# Patient Record
Sex: FEMALE | Race: BLACK OR AFRICAN AMERICAN | NOT HISPANIC OR LATINO | Employment: FULL TIME | URBAN - METROPOLITAN AREA
[De-identification: names, ages, dates, MRNs, and addresses within clinical notes are randomized per-mention and may not be internally consistent; named-entity substitution may affect disease eponyms.]

---

## 2017-01-18 ENCOUNTER — TELEPHONE (OUTPATIENT)
Dept: HEMATOLOGY/ONCOLOGY | Facility: CLINIC | Age: 32
End: 2017-01-18

## 2017-01-18 DIAGNOSIS — D57.40 SICKLE CELL BETA THALASSEMIA: Primary | ICD-10-CM

## 2017-01-18 NOTE — TELEPHONE ENCOUNTER
----- Message from Laila Etienne sent at 1/18/2017 11:40 AM CST -----  Contact: self  Pt was in the hospital and is calling to schedule an hospital follow up.    Contact number [phone]

## 2017-01-18 NOTE — TELEPHONE ENCOUNTER
Called patient and informed Dr Harrison wanted her to do a cbc next week. Scheduled Jan 24 at 9:30.

## 2017-01-18 NOTE — TELEPHONE ENCOUNTER
Patient said when she got discharged after having her baby they told her she needed to follow up with you. Her platelets were low and she received 2 blood transfusions in the last 2 months. She also wanted to know if we received any records from [facility].

## 2017-01-24 ENCOUNTER — LAB VISIT (OUTPATIENT)
Dept: LAB | Facility: OTHER | Age: 32
End: 2017-01-24
Attending: INTERNAL MEDICINE
Payer: MEDICAID

## 2017-01-24 ENCOUNTER — DOCUMENTATION ONLY (OUTPATIENT)
Dept: HEMATOLOGY/ONCOLOGY | Facility: CLINIC | Age: 32
End: 2017-01-24

## 2017-01-24 DIAGNOSIS — D57.40 SICKLE CELL BETA THALASSEMIA: ICD-10-CM

## 2017-01-24 LAB
ERYTHROCYTE [DISTWIDTH] IN BLOOD BY AUTOMATED COUNT: 23.4 %
HCT VFR BLD AUTO: 30.3 %
HGB BLD-MCNC: 9.9 G/DL
MCH RBC QN AUTO: 23.1 PG
MCHC RBC AUTO-ENTMCNC: 32.7 %
MCV RBC AUTO: 71 FL
NEUTROPHILS # BLD AUTO: 3.9 K/UL
PLATELET # BLD AUTO: 1026 K/UL
PLATELET BLD QL SMEAR: ABNORMAL
PMV BLD AUTO: 9.1 FL
RBC # BLD AUTO: 4.29 M/UL
WBC # BLD AUTO: 7.87 K/UL

## 2017-01-24 PROCEDURE — 36415 COLL VENOUS BLD VENIPUNCTURE: CPT

## 2017-01-24 PROCEDURE — 85027 COMPLETE CBC AUTOMATED: CPT

## 2017-01-30 ENCOUNTER — LAB VISIT (OUTPATIENT)
Dept: LAB | Facility: OTHER | Age: 32
End: 2017-01-30
Attending: INTERNAL MEDICINE
Payer: MEDICAID

## 2017-01-30 DIAGNOSIS — D57.40 SICKLE CELL BETA THALASSEMIA: ICD-10-CM

## 2017-01-30 DIAGNOSIS — D75.839 THROMBOCYTOSIS: ICD-10-CM

## 2017-01-30 LAB
ERYTHROCYTE [DISTWIDTH] IN BLOOD BY AUTOMATED COUNT: 23.9 %
HCT VFR BLD AUTO: 26.3 %
HGB BLD-MCNC: 8.6 G/DL
MCH RBC QN AUTO: 22.3 PG
MCHC RBC AUTO-ENTMCNC: 32.7 %
MCV RBC AUTO: 68 FL
NEUTROPHILS # BLD AUTO: 5.3 K/UL
PLATELET # BLD AUTO: 585 K/UL
PMV BLD AUTO: 9.3 FL
RBC # BLD AUTO: 3.86 M/UL
WBC # BLD AUTO: 10.64 K/UL

## 2017-01-30 PROCEDURE — 85027 COMPLETE CBC AUTOMATED: CPT

## 2017-01-30 PROCEDURE — 36415 COLL VENOUS BLD VENIPUNCTURE: CPT

## 2017-02-13 ENCOUNTER — LAB VISIT (OUTPATIENT)
Dept: LAB | Facility: OTHER | Age: 32
End: 2017-02-13
Attending: INTERNAL MEDICINE
Payer: MEDICAID

## 2017-02-13 DIAGNOSIS — D75.839 THROMBOCYTOSIS: ICD-10-CM

## 2017-02-13 LAB
ERYTHROCYTE [DISTWIDTH] IN BLOOD BY AUTOMATED COUNT: 23 %
HCT VFR BLD AUTO: 26.1 %
HGB BLD-MCNC: 8.4 G/DL
MCH RBC QN AUTO: 21.8 PG
MCHC RBC AUTO-ENTMCNC: 32.2 %
MCV RBC AUTO: 68 FL
NEUTROPHILS # BLD AUTO: 7 K/UL
PLATELET # BLD AUTO: 431 K/UL
PMV BLD AUTO: 9.4 FL
RBC # BLD AUTO: 3.85 M/UL
WBC # BLD AUTO: 10.81 K/UL

## 2017-02-13 PROCEDURE — 81270 JAK2 GENE: CPT

## 2017-02-13 PROCEDURE — 85027 COMPLETE CBC AUTOMATED: CPT

## 2017-02-13 PROCEDURE — 36415 COLL VENOUS BLD VENIPUNCTURE: CPT

## 2017-02-15 LAB
JAK2 P.V617F BLD/T QL: NORMAL
JAK2 V617F MUTATION DETECTION BLD RESULT: NORMAL

## 2017-04-07 ENCOUNTER — LAB VISIT (OUTPATIENT)
Dept: LAB | Facility: OTHER | Age: 32
End: 2017-04-07
Attending: INTERNAL MEDICINE
Payer: MEDICAID

## 2017-04-07 DIAGNOSIS — D57.40 SICKLE CELL BETA THALASSEMIA: ICD-10-CM

## 2017-04-07 LAB — FERRITIN SERPL-MCNC: 270 NG/ML

## 2017-04-07 PROCEDURE — 82728 ASSAY OF FERRITIN: CPT

## 2017-04-07 PROCEDURE — 36415 COLL VENOUS BLD VENIPUNCTURE: CPT

## 2017-04-10 ENCOUNTER — OFFICE VISIT (OUTPATIENT)
Dept: HEMATOLOGY/ONCOLOGY | Facility: CLINIC | Age: 32
End: 2017-04-10
Attending: INTERNAL MEDICINE
Payer: MEDICAID

## 2017-04-10 VITALS
WEIGHT: 127.44 LBS | HEART RATE: 61 BPM | RESPIRATION RATE: 16 BRPM | TEMPERATURE: 99 F | DIASTOLIC BLOOD PRESSURE: 86 MMHG | BODY MASS INDEX: 23.12 KG/M2 | SYSTOLIC BLOOD PRESSURE: 140 MMHG

## 2017-04-10 DIAGNOSIS — D57.40 SICKLE CELL BETA THALASSEMIA: Primary | ICD-10-CM

## 2017-04-10 PROCEDURE — 99213 OFFICE O/P EST LOW 20 MIN: CPT | Mod: PBBFAC | Performed by: INTERNAL MEDICINE

## 2017-04-10 PROCEDURE — 99999 PR PBB SHADOW E&M-EST. PATIENT-LVL III: CPT | Mod: PBBFAC,,, | Performed by: INTERNAL MEDICINE

## 2017-04-10 PROCEDURE — 99212 OFFICE O/P EST SF 10 MIN: CPT | Mod: S$PBB,,, | Performed by: INTERNAL MEDICINE

## 2017-04-10 RX ORDER — FERROUS GLUCONATE 324(38)MG
TABLET ORAL
Refills: 3 | COMMUNITY
Start: 2017-01-05

## 2017-04-10 NOTE — PROGRESS NOTES
Subjective:       Patient ID: Shi Tidwell is a 32 y.o. female.    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  She recently saw me to establish follow-up.  Unfortunately her disease has been mild with few complications.    She delivered a healthy girl at the beginning of this year and postdelivery her platelet count went up to greater than 1 million.  That was on January 24.  Subsequently week later her platelet count had decreased to 500,000 range and then 3 weeks later to 431,000.  Overall, she's been doing fairly well.  Chest occasional chest pain.  Energy levels been held up fairly well.      Review of Systems    Objective:      Physical Exam   Constitutional: She appears well-developed and well-nourished.   Vitals reviewed.      Assessment:     ferritin levels 270, her most recent hemoglobin was 8.4 on February 13. 1. Sickle cell beta thalassemia        Plan:       Return to clinic in 6 months with labs.

## 2017-10-18 ENCOUNTER — LAB VISIT (OUTPATIENT)
Dept: LAB | Facility: OTHER | Age: 32
End: 2017-10-18
Attending: INTERNAL MEDICINE
Payer: MEDICAID

## 2017-10-18 DIAGNOSIS — D57.40 SICKLE CELL BETA THALASSEMIA: ICD-10-CM

## 2017-10-18 LAB
ALBUMIN SERPL BCP-MCNC: 4.3 G/DL
ALP SERPL-CCNC: 61 U/L
ALT SERPL W/O P-5'-P-CCNC: 12 U/L
ANION GAP SERPL CALC-SCNC: 8 MMOL/L
AST SERPL-CCNC: 15 U/L
BILIRUB SERPL-MCNC: 1.3 MG/DL
BUN SERPL-MCNC: 9 MG/DL
CALCIUM SERPL-MCNC: 9.4 MG/DL
CHLORIDE SERPL-SCNC: 110 MMOL/L
CO2 SERPL-SCNC: 26 MMOL/L
CREAT SERPL-MCNC: 0.9 MG/DL
ERYTHROCYTE [DISTWIDTH] IN BLOOD BY AUTOMATED COUNT: 18.4 %
EST. GFR  (AFRICAN AMERICAN): >60 ML/MIN/1.73 M^2
EST. GFR  (NON AFRICAN AMERICAN): >60 ML/MIN/1.73 M^2
GLUCOSE SERPL-MCNC: 86 MG/DL
HCT VFR BLD AUTO: 29.5 %
HGB BLD-MCNC: 10.1 G/DL
MCH RBC QN AUTO: 21.8 PG
MCHC RBC AUTO-ENTMCNC: 34.2 G/DL
MCV RBC AUTO: 64 FL
NEUTROPHILS # BLD AUTO: 4.7 K/UL
PLATELET # BLD AUTO: 437 K/UL
PMV BLD AUTO: 9.2 FL
POTASSIUM SERPL-SCNC: 4.5 MMOL/L
PROT SERPL-MCNC: 7.6 G/DL
RBC # BLD AUTO: 4.63 M/UL
SODIUM SERPL-SCNC: 144 MMOL/L
WBC # BLD AUTO: 9.42 K/UL

## 2017-10-18 PROCEDURE — 80053 COMPREHEN METABOLIC PANEL: CPT

## 2017-10-18 PROCEDURE — 36415 COLL VENOUS BLD VENIPUNCTURE: CPT

## 2017-10-18 PROCEDURE — 85027 COMPLETE CBC AUTOMATED: CPT

## 2017-11-17 ENCOUNTER — TELEPHONE (OUTPATIENT)
Dept: HEMATOLOGY/ONCOLOGY | Facility: CLINIC | Age: 32
End: 2017-11-17

## 2017-11-17 NOTE — TELEPHONE ENCOUNTER
Message fwd to .       ----- Message from Darrell Sawant sent at 11/17/2017 10:12 AM CST -----  Contact: Shi Tidwell  X_  1st Request  _  2nd Request  _  3rd Request        Who: Shi Tidwell    Why: Patient called to reschedule appt and next available it [place] 10. Patient checking to see if anything available sooner.    What Number to Call Back: [phone] or [phone]    When to Expect a call back: (Within 24 hours)    Please return the call at earliest convenience. Thanks!

## 2018-02-08 ENCOUNTER — OFFICE VISIT (OUTPATIENT)
Dept: HEMATOLOGY/ONCOLOGY | Facility: CLINIC | Age: 33
End: 2018-02-08
Attending: INTERNAL MEDICINE
Payer: MEDICAID

## 2018-02-08 ENCOUNTER — LAB VISIT (OUTPATIENT)
Dept: LAB | Facility: OTHER | Age: 33
End: 2018-02-08
Attending: INTERNAL MEDICINE
Payer: MEDICAID

## 2018-02-08 VITALS
DIASTOLIC BLOOD PRESSURE: 96 MMHG | HEART RATE: 63 BPM | TEMPERATURE: 99 F | SYSTOLIC BLOOD PRESSURE: 133 MMHG | BODY MASS INDEX: 23.48 KG/M2 | RESPIRATION RATE: 19 BRPM | WEIGHT: 129.44 LBS

## 2018-02-08 DIAGNOSIS — D57.40 SICKLE CELL BETA THALASSEMIA: Primary | ICD-10-CM

## 2018-02-08 DIAGNOSIS — D57.40 SICKLE CELL BETA THALASSEMIA: ICD-10-CM

## 2018-02-08 LAB
ERYTHROCYTE [DISTWIDTH] IN BLOOD BY AUTOMATED COUNT: 18.8 %
HCT VFR BLD AUTO: 27.5 %
HGB BLD-MCNC: 9.4 G/DL
MCH RBC QN AUTO: 22.1 PG
MCHC RBC AUTO-ENTMCNC: 34.2 G/DL
MCV RBC AUTO: 65 FL
NEUTROPHILS # BLD AUTO: 6.1 K/UL
PLATELET # BLD AUTO: 427 K/UL
PMV BLD AUTO: 9 FL
RBC # BLD AUTO: 4.26 M/UL
WBC # BLD AUTO: 12.17 K/UL

## 2018-02-08 PROCEDURE — 99213 OFFICE O/P EST LOW 20 MIN: CPT | Mod: S$PBB,,, | Performed by: INTERNAL MEDICINE

## 2018-02-08 PROCEDURE — 99213 OFFICE O/P EST LOW 20 MIN: CPT | Mod: PBBFAC | Performed by: INTERNAL MEDICINE

## 2018-02-08 PROCEDURE — 85027 COMPLETE CBC AUTOMATED: CPT

## 2018-02-08 PROCEDURE — 99999 PR PBB SHADOW E&M-EST. PATIENT-LVL III: CPT | Mod: PBBFAC,,, | Performed by: INTERNAL MEDICINE

## 2018-02-08 PROCEDURE — 3008F BODY MASS INDEX DOCD: CPT | Mod: ,,, | Performed by: INTERNAL MEDICINE

## 2018-02-08 PROCEDURE — 36415 COLL VENOUS BLD VENIPUNCTURE: CPT

## 2018-02-08 NOTE — PROGRESS NOTES
Subjective:       Patient ID: Shi Tidwell is a 33 y.o. female.    Chief Complaint: No chief complaint on file.    Darius Tidwell is a very pleasant 31-year-old -American female with a history of sickle cell thalassemia.   She also had an episode of thrombocytosis after delivering a baby in early 2017 which resolved spontaneously.       She has had no significant issues since her last visit. Her only complaint is some fatigue.      Review of Systems   Constitutional: Negative for activity change and appetite change.   Respiratory: Negative for cough and shortness of breath.    Cardiovascular: Negative for chest pain.   Gastrointestinal: Negative for abdominal pain, constipation and diarrhea.   Neurological: Negative.        Objective:      Physical Exam   Constitutional: She is oriented to person, place, and time. She appears well-developed and well-nourished.   HENT:   Mouth/Throat: No oropharyngeal exudate.   Cardiovascular: Normal rate and regular rhythm.    Pulmonary/Chest: Breath sounds normal. No respiratory distress. She has no wheezes.   Abdominal: Soft. There is no tenderness.   Neurological: She is alert and oriented to person, place, and time.   Psychiatric: She has a normal mood and affect. Her behavior is normal. Thought content normal.   Vitals reviewed.      Assessment:     Hgb 9.4, plts 427,000  1. Sickle cell beta thalassemia        Plan:       Return to clinic in 6 months with labs.     Distress Screening Results: Psychosocial Distress screening score of Distress Score: 0 noted and reviewed. No intervention indicated.

## 2018-05-31 DIAGNOSIS — D57.40 SICKLE CELL BETA THALASSEMIA: Primary | ICD-10-CM

## 2018-10-26 ENCOUNTER — HOSPITAL ENCOUNTER (EMERGENCY)
Facility: OTHER | Age: 33
Discharge: HOME | End: 2018-10-26
Attending: EMERGENCY MEDICINE
Payer: MEDICAID

## 2018-10-26 VITALS
WEIGHT: 130 LBS | BODY MASS INDEX: 23.92 KG/M2 | OXYGEN SATURATION: 100 % | SYSTOLIC BLOOD PRESSURE: 142 MMHG | HEART RATE: 59 BPM | DIASTOLIC BLOOD PRESSURE: 88 MMHG | RESPIRATION RATE: 16 BRPM | HEIGHT: 62 IN | TEMPERATURE: 98 F

## 2018-10-26 DIAGNOSIS — R07.9 CHEST PAIN: ICD-10-CM

## 2018-10-26 DIAGNOSIS — M54.6 ACUTE LEFT-SIDED THORACIC BACK PAIN: Primary | ICD-10-CM

## 2018-10-26 DIAGNOSIS — R10.9 FLANK PAIN: ICD-10-CM

## 2018-10-26 LAB
ALBUMIN SERPL BCP-MCNC: 4.7 G/DL
ALP SERPL-CCNC: 47 U/L
ALT SERPL W/O P-5'-P-CCNC: 11 U/L
ANION GAP SERPL CALC-SCNC: 10 MMOL/L
ANISOCYTOSIS BLD QL SMEAR: ABNORMAL
AST SERPL-CCNC: 20 U/L
B-HCG UR QL: NEGATIVE
BASOPHILS # BLD AUTO: 0.07 K/UL
BASOPHILS NFR BLD: 0.6 %
BILIRUB SERPL-MCNC: 1.9 MG/DL
BILIRUB UR QL STRIP: NEGATIVE
BUN SERPL-MCNC: 8 MG/DL
CALCIUM SERPL-MCNC: 9.5 MG/DL
CHLORIDE SERPL-SCNC: 106 MMOL/L
CLARITY UR: CLEAR
CO2 SERPL-SCNC: 24 MMOL/L
COLOR UR: YELLOW
CREAT SERPL-MCNC: 0.9 MG/DL
CTP QC/QA: YES
DIFFERENTIAL METHOD: ABNORMAL
EOSINOPHIL # BLD AUTO: 0.2 K/UL
EOSINOPHIL NFR BLD: 1.6 %
ERYTHROCYTE [DISTWIDTH] IN BLOOD BY AUTOMATED COUNT: 18.8 %
EST. GFR  (AFRICAN AMERICAN): >60 ML/MIN/1.73 M^2
EST. GFR  (NON AFRICAN AMERICAN): >60 ML/MIN/1.73 M^2
GLUCOSE SERPL-MCNC: 73 MG/DL
GLUCOSE UR QL STRIP: NEGATIVE
HCT VFR BLD AUTO: 30 %
HGB BLD-MCNC: 10.1 G/DL
HGB UR QL STRIP: NEGATIVE
KETONES UR QL STRIP: NEGATIVE
LEUKOCYTE ESTERASE UR QL STRIP: NEGATIVE
LYMPHOCYTES # BLD AUTO: 3.8 K/UL
LYMPHOCYTES NFR BLD: 34.2 %
MCH RBC QN AUTO: 22.1 PG
MCHC RBC AUTO-ENTMCNC: 33.7 G/DL
MCV RBC AUTO: 66 FL
MONOCYTES # BLD AUTO: 0.8 K/UL
MONOCYTES NFR BLD: 6.7 %
NEUTROPHILS # BLD AUTO: 6.3 K/UL
NEUTROPHILS NFR BLD: 56.9 %
NITRITE UR QL STRIP: NEGATIVE
NRBC BLD-RTO: 2 /100 WBC
PH UR STRIP: 6 [PH] (ref 5–8)
PLATELET # BLD AUTO: 473 K/UL
PLATELET BLD QL SMEAR: ABNORMAL
PMV BLD AUTO: 9.2 FL
POIKILOCYTOSIS BLD QL SMEAR: ABNORMAL
POLYCHROMASIA BLD QL SMEAR: ABNORMAL
POTASSIUM SERPL-SCNC: 3.8 MMOL/L
PROT SERPL-MCNC: 8 G/DL
PROT UR QL STRIP: NEGATIVE
RBC # BLD AUTO: 4.58 M/UL
RETICS/RBC NFR AUTO: 4.6 %
SCHISTOCYTES BLD QL SMEAR: ABNORMAL
SICKLE CELLS BLD QL SMEAR: ABNORMAL
SODIUM SERPL-SCNC: 140 MMOL/L
SP GR UR STRIP: 1.01 (ref 1–1.03)
TARGETS BLD QL SMEAR: ABNORMAL
URN SPEC COLLECT METH UR: NORMAL
UROBILINOGEN UR STRIP-ACNC: NEGATIVE EU/DL
WBC # BLD AUTO: 11.17 K/UL

## 2018-10-26 PROCEDURE — 93005 ELECTROCARDIOGRAM TRACING: CPT

## 2018-10-26 PROCEDURE — 81025 URINE PREGNANCY TEST: CPT | Performed by: EMERGENCY MEDICINE

## 2018-10-26 PROCEDURE — 96361 HYDRATE IV INFUSION ADD-ON: CPT

## 2018-10-26 PROCEDURE — 81003 URINALYSIS AUTO W/O SCOPE: CPT

## 2018-10-26 PROCEDURE — 85045 AUTOMATED RETICULOCYTE COUNT: CPT

## 2018-10-26 PROCEDURE — 96360 HYDRATION IV INFUSION INIT: CPT

## 2018-10-26 PROCEDURE — 80053 COMPREHEN METABOLIC PANEL: CPT

## 2018-10-26 PROCEDURE — 85025 COMPLETE CBC W/AUTO DIFF WBC: CPT

## 2018-10-26 PROCEDURE — 99285 EMERGENCY DEPT VISIT HI MDM: CPT | Mod: 25

## 2018-10-26 PROCEDURE — 93010 ELECTROCARDIOGRAM REPORT: CPT | Mod: ,,, | Performed by: INTERNAL MEDICINE

## 2018-10-26 PROCEDURE — 25000003 PHARM REV CODE 250: Performed by: PHYSICIAN ASSISTANT

## 2018-10-26 RX ORDER — HYDROCODONE BITARTRATE AND ACETAMINOPHEN 5; 325 MG/1; MG/1
1 TABLET ORAL
Status: COMPLETED | OUTPATIENT
Start: 2018-10-26 | End: 2018-10-26

## 2018-10-26 RX ORDER — CETIRIZINE HYDROCHLORIDE 10 MG/1
10 TABLET ORAL DAILY
COMMUNITY

## 2018-10-26 RX ORDER — FLUTICASONE PROPIONATE 50 MCG
1 SPRAY, SUSPENSION (ML) NASAL DAILY
COMMUNITY

## 2018-10-26 RX ORDER — HYDROCODONE BITARTRATE AND ACETAMINOPHEN 5; 325 MG/1; MG/1
1 TABLET ORAL EVERY 6 HOURS PRN
Qty: 8 TABLET | Refills: 0 | Status: SHIPPED | OUTPATIENT
Start: 2018-10-26 | End: 2018-11-05

## 2018-10-26 RX ADMIN — SODIUM CHLORIDE 1000 ML: 0.9 INJECTION, SOLUTION INTRAVENOUS at 12:10

## 2018-10-26 RX ADMIN — HYDROCODONE BITARTRATE AND ACETAMINOPHEN 1 TABLET: 5; 325 TABLET ORAL at 11:10

## 2018-10-26 NOTE — ED TRIAGE NOTES
"Pt c/o anterior & lateral left lower rib pain which started Wednesday which has progressively worsened. Pt also states "I've been achy this week." Pt also reports upper LLE pain.Pt exacerbated with inspiration & movement.  "

## 2018-10-26 NOTE — ED PROVIDER NOTES
Encounter Date: 10/26/2018       History     Chief Complaint   Patient presents with    Flank Pain     L flank pain getting more constant since Wednesday. worse with deep breath.     The history is provided by the patient.     Review of patient's allergies indicates:   Allergen Reactions    Vicodin [hydrocodone-acetaminophen] Itching     Past Medical History:   Diagnosis Date    Avascular necrosis of hip left    Encounter for blood transfusion     Environmental allergies     Sickle cell anemia      Past Surgical History:   Procedure Laterality Date     SECTION       History reviewed. No pertinent family history.  Social History     Tobacco Use    Smoking status: Never Smoker    Smokeless tobacco: Never Used   Substance Use Topics    Alcohol use: No     Frequency: Never    Drug use: No     Review of Systems    Physical Exam     Initial Vitals [10/26/18 0947]   BP Pulse Resp Temp SpO2   (!) 135/90 63 18 98.3 °F (36.8 °C) 99 %      MAP       --         Physical Exam    ED Course   Procedures  Labs Reviewed   URINALYSIS, REFLEX TO URINE CULTURE    Narrative:     Preferred Collection Type->Urine, Clean Catch   POCT URINE PREGNANCY          Imaging Results    None                               Clinical Impression:   No diagnosis found.

## 2018-10-26 NOTE — ED PROVIDER NOTES
Encounter Date: 10/26/2018       History     Chief Complaint   Patient presents with    Flank Pain     L flank pain getting more constant since Wednesday. worse with deep breath.     33-year-old female with history of avascular necrosis of the hip, blood transfusion, and for mental allergies and sickle cell beta thalassemia presents to the emergency department with complaints of left flank/upper back and lateral chest wall pain. She states that her symptoms started on Wednesday.  She admits that she currently takes vitamins for her sickle cell.  She states that she does have prescribed hydrocodone at home however she does not frequently take this medication.  She admits that she has not taken it since her symptoms started.  She states that this pain feels different than when she has had sickle cell crisis in the past.  She reports that the last time she had symptoms related to sickle cell crisis was in 2016.  She states that the time she had leg pain and back pain. She denies any actual chest pain or shortness of breath.  She states the pain is worse when she takes a deep breath.  She denies any trauma, injury, fever, chills, urinary symptoms, nausea or vomiting. She reports that the pain is currently a 7/10.      The history is provided by the patient.     Review of patient's allergies indicates:   Allergen Reactions    Vicodin [hydrocodone-acetaminophen] Itching     Past Medical History:   Diagnosis Date    Avascular necrosis of hip left    Encounter for blood transfusion     Environmental allergies     Sickle cell anemia      Past Surgical History:   Procedure Laterality Date     SECTION       History reviewed. No pertinent family history.  Social History     Tobacco Use    Smoking status: Never Smoker    Smokeless tobacco: Never Used   Substance Use Topics    Alcohol use: No     Frequency: Never    Drug use: No     Review of Systems   Constitutional: Negative for chills and fever.   HENT:  Negative for sore throat.    Respiratory: Negative for cough, chest tightness and shortness of breath.    Cardiovascular: Negative for chest pain.   Gastrointestinal: Negative for abdominal pain, nausea and vomiting.   Genitourinary: Positive for flank pain. Negative for difficulty urinating, dysuria, hematuria, pelvic pain, urgency, vaginal bleeding and vaginal discharge.   Musculoskeletal: Positive for back pain. Negative for neck pain and neck stiffness.   Skin: Negative for rash.   Neurological: Negative for weakness and numbness.   Hematological: Does not bruise/bleed easily.       Physical Exam     Initial Vitals [10/26/18 0947]   BP Pulse Resp Temp SpO2   (!) 135/90 63 18 98.3 °F (36.8 °C) 99 %      MAP       --         Physical Exam    Nursing note and vitals reviewed.  Constitutional: She appears well-developed and well-nourished. She is not diaphoretic.  Non-toxic appearance. No distress.   HENT:   Head: Normocephalic and atraumatic.   Right Ear: External ear normal.   Left Ear: External ear normal.   Nose: Nose normal.   Mouth/Throat: Oropharynx is clear and moist.   Eyes: Conjunctivae, EOM and lids are normal. Pupils are equal, round, and reactive to light. No scleral icterus.   Neck: Normal range of motion and phonation normal. Neck supple. No spinous process tenderness and no muscular tenderness present. Normal range of motion present. No neck rigidity.   Cardiovascular: Normal rate, regular rhythm, normal heart sounds, intact distal pulses and normal pulses. Exam reveals no gallop, no friction rub and no decreased pulses.    No murmur heard.  Pulses:       Radial pulses are 2+ on the right side, and 2+ on the left side.        Dorsalis pedis pulses are 2+ on the right side, and 2+ on the left side.   Pulmonary/Chest: Effort normal and breath sounds normal. No respiratory distress. She has no decreased breath sounds. She has no wheezes. She has no rhonchi. She has no rales. She exhibits no tenderness,  no bony tenderness, no crepitus, no edema, no deformity and no retraction.   Abdominal: Normal appearance. There is no CVA tenderness.   Musculoskeletal: Normal range of motion.        Arms:  No obvious deformities, moving all extremities, normal gait  Pain to the left flank/lateral chest wall with no TTP on exam.   No midline TTP or step offs to cervical, thoracic or lumbar spine. No paraspinal muscle TTP. FROM of spine without discomfort or pain. No signs of trauma or injury.   No TTP to hips or LEs. Intact distal pulses with no sensory deficits. FROM of bilateral hips without elicited pain   Neurological: She is alert and oriented to person, place, and time. She has normal strength. No sensory deficit.   Skin: Skin is warm, dry and intact. No abrasion, no bruising, no ecchymosis, no laceration, no lesion and no rash noted. No erythema.   Psychiatric: She has a normal mood and affect. Her speech is normal and behavior is normal. Judgment normal. Cognition and memory are normal.         ED Course   Procedures  Labs Reviewed   CBC W/ AUTO DIFFERENTIAL - Abnormal; Notable for the following components:       Result Value    Hemoglobin 10.1 (*)     Hematocrit 30.0 (*)     MCV 66 (*)     MCH 22.1 (*)     RDW 18.8 (*)     Platelets 473 (*)     nRBC 2 (*)     Platelet Estimate Increased (*)     Sickle Cells Moderate (*)     All other components within normal limits   COMPREHENSIVE METABOLIC PANEL - Abnormal; Notable for the following components:    Total Bilirubin 1.9 (*)     Alkaline Phosphatase 47 (*)     All other components within normal limits   RETICULOCYTES - Abnormal; Notable for the following components:    Retic 4.6 (*)     All other components within normal limits   URINALYSIS, REFLEX TO URINE CULTURE    Narrative:     Preferred Collection Type->Urine, Clean Catch   POCT URINE PREGNANCY     EKG Readings: (Independently Interpreted)   Initial Reading: No STEMI. Rhythm: Sinus Bradycardia. Heart Rate: 56. Ectopy:  No Ectopy. Conduction: Normal. ST Segments: Normal ST Segments. T Waves: Normal. Clinical Impression: Sinus Bradycardia       Imaging Results          X-Ray Chest PA And Lateral (Final result)  Result time 10/26/18 11:32:03    Final result by Leonardo Rebollar MD (10/26/18 11:32:03)                 Impression:      No acute abnormality.      Electronically signed by: Leonardo Rebollar MD  Date:    10/26/2018  Time:    11:32             Narrative:    EXAMINATION:  XR CHEST PA AND LATERAL    CLINICAL HISTORY:  Chest pain, unspecified    TECHNIQUE:  PA and lateral views of the chest were performed.    COMPARISON:  None    FINDINGS:  The lungs are clear, with normal appearance of pulmonary vasculature and no pleural effusion or pneumothorax.    The cardiac silhouette is normal in size. The hilar and mediastinal contours are unremarkable.    Bones are intact.                                 Medical Decision Making:   History:   Old Medical Records: I decided to obtain old medical records.  Initial Assessment:   33-year-old female with complaints consistent with left flank/back/chest wall pain.  Vital signs stable, afebrile, neurovascularly intact.  She is alert and healthy and nontoxic appearing.  She is nondistressed.  She has no tenderness palpation on exam.  No CVA tenderness palpation.  She denies urinary symptoms.  She denies cough, congestion, chest pain or shortness of breath.  She does report that the pain is worse when she takes a deep breath. Patient with history of sickle cell beta thalassemia.  Differential diagnosis includes but not limited to sickle cell pain crisis, acute chest, pneumonia, PE, pneumothorax, cardiac etiology, UTI/pyelonephritis, renal calculi.  I have low suspicion for pneumonia or PE as she is asymptomatic that the symptoms and is not tachycardic.  I also have low suspicion for cardiac etiology based on history and exam.  She does not seem to be an aplastic crisis nor do I suspect acute  chest  Independently Interpreted Test(s):   I have ordered and independently interpreted EKG Reading(s) - see prior notes  Clinical Tests:   Lab Tests: Ordered and Reviewed  Radiological Study: Ordered and Reviewed  Medical Tests: Ordered and Reviewed  ED Management:  UPT and urinalysis obtained from triage.  UPT negative.  Urinalysis shows no evidence of serious bacterial infection, UTI or pyelonephritis.  CBC, CMP and reticulocyte count also obtained. No elevation white blood cell count H&H is stable at 10.1 and 30.  She has moderate sicca cells present on smear.  No acute electrolyte abnormality to explain her symptoms. Her reticulocyte count is 4.6.  Chest x-ray and EKG obtained. Chest x-ray shows no evidence of infiltrate, pleural effusion, mass consolidation, lesion or pneumothorax.  EKG consistent with sinus bradycardia without evidence of acute ischemia or STEMI.  She was administered IV fluids as well as oral Norco, patient reports taking hydrocodone at home despite Vicodin being listed as an allergy.  She reports that she feels much better and her pain is significantly improved.  She does state that she is ready for discharge home.  I do not feel that further workup is indicated.  Will discharge home with care instructions and a short course oral Norco.  She is urged rest and hydration and to continue taking her other home medications.  She states understanding and agrees this plan.  This is the extent of patient's complaints today.  This patient was discussed with the attending physician who agrees with treatment plan.  Other:   I have discussed this case with another health care provider.       <> Summary of the Discussion: Maurizio  This note was created using [employer] dictation.  There may be typographical errors secondary to dictation.                        Clinical Impression:     1. Acute left-sided thoracic back pain    2. Chest pain    3. Flank pain            Disposition:   Disposition:  Discharged  Condition: Stable                        Ingrid Linares PA-C  10/26/18 0307

## 2018-10-26 NOTE — ED NOTES
Patient Identifiers for Shi Saba checked and correct  LOC: The patient is awake, alert and aware of environment with an appropriate affect, the patient is oriented x 3 and speaking appropriate.  APPEARANCE: Patient resting comfortably and in no acute distress. The patient is clean and well groomed. The patient's clothing is properly fastened.  SKIN: The skin is warm and dry. The patient has normal skin turgor and moist mucus membranes. No rashes or lesions upon observation. Skin Intact , no breakdown noted.  Musculoskeletal :  Normal range of motion noted. Moves all extremities well, No swelling. Tenderness noted upon palpation of the anterior & lateral left ribs.  RESPIRATORY: Airway is open and patent, respirations are spontaneous, patient has a normal effort and rate. Breath sounds are clear & equal, bilaterally.  PULSES: 2+ radial pulses, symmetrical in all extremities.    Will continue to monitor

## 2018-12-13 ENCOUNTER — TELEPHONE (OUTPATIENT)
Dept: HEMATOLOGY/ONCOLOGY | Facility: CLINIC | Age: 33
End: 2018-12-13

## 2018-12-13 NOTE — TELEPHONE ENCOUNTER
Reached out to patient to assist with scheduling hospital and 6 month f/u. Patient due for 6 month f/u. appointment offered for 12/27 at 2 pm for [facility]. Patient voiced understanding and confirmed, appointment slip to be mailed out.

## 2018-12-13 NOTE — TELEPHONE ENCOUNTER
----- Message from Griselda Dey sent at 12/13/2018  3:28 PM CST -----  Contact: Pt    Name of Who is Calling:RICKY MEEKS [[id number]]    What is the request in detail: patient would like to reschedule follow up visit Please contact to further discuss and advise    Can the clinic reply by [facility]: Yes  [facility]: [phone]

## 2018-12-26 NOTE — PROGRESS NOTES
Subjective:       Patient ID: Shi Saba is a 33 y.o. female.    Chief Complaint: No chief complaint on file.    Darius Tidwell is a very pleasant 33-year-old -American female with a history of sickle cell thalassemia.  That has been associated with mild chronic anemia.  She also had an episode of thrombocytosis after delivering a baby in early 2017 which resolved spontaneously.     CBC from February of this year showed hemoglobin 9.4 with an MCV of 65, and October hemoglobin was 10.1 with an MCV of 66.    She reports she had a sickle cell crisis several weeks ago with manifestations of some back pain rib pain and some shortness of breath.  She now has some residual tingling in her legs and some puffiness of her feet.        Review of Systems   Constitutional: Negative for activity change and appetite change.   Respiratory: Negative for cough and shortness of breath.    Cardiovascular: Negative for chest pain.   Gastrointestinal: Negative for abdominal pain, constipation and diarrhea.   Genitourinary:        Some cramping with her IUD   Musculoskeletal:        Tingling in aching in her legs   Neurological: Negative.    Psychiatric/Behavioral: Negative for dysphoric mood. The patient is not nervous/anxious.        Objective:      Physical Exam   Constitutional: She is oriented to person, place, and time. She appears well-developed and well-nourished.   HENT:   Mouth/Throat: No oropharyngeal exudate.   Cardiovascular: Normal rate and regular rhythm.   Pulmonary/Chest: Breath sounds normal. No respiratory distress. She has no wheezes.   Abdominal: Soft. There is no tenderness.   Musculoskeletal: She exhibits no edema.   Lymphadenopathy:     She has no cervical adenopathy.   Neurological: She is alert and oriented to person, place, and time.   Psychiatric: She has a normal mood and affect. Her behavior is normal. Thought content normal.   Vitals reviewed.      Assessment:     CBC today shows a  hemoglobin of 8.8 with normal white blood cell count, platelet count is 370481.  1. Sickle cell beta thalassemia        Plan:     I encouraged her to try some ibuprofen for her musculoskeletal symptoms.  Return to clinic in 3 months with labs-CBC and ferritin.

## 2018-12-27 ENCOUNTER — LAB VISIT (OUTPATIENT)
Dept: LAB | Facility: OTHER | Age: 33
End: 2018-12-27
Attending: INTERNAL MEDICINE
Payer: MEDICAID

## 2018-12-27 ENCOUNTER — OFFICE VISIT (OUTPATIENT)
Dept: HEMATOLOGY/ONCOLOGY | Facility: CLINIC | Age: 33
End: 2018-12-27
Attending: INTERNAL MEDICINE
Payer: MEDICAID

## 2018-12-27 VITALS
HEART RATE: 63 BPM | RESPIRATION RATE: 16 BRPM | OXYGEN SATURATION: 100 % | TEMPERATURE: 99 F | HEIGHT: 62 IN | DIASTOLIC BLOOD PRESSURE: 77 MMHG | WEIGHT: 129.44 LBS | BODY MASS INDEX: 23.82 KG/M2 | SYSTOLIC BLOOD PRESSURE: 119 MMHG

## 2018-12-27 DIAGNOSIS — D57.40 SICKLE CELL BETA THALASSEMIA: ICD-10-CM

## 2018-12-27 DIAGNOSIS — D57.40 SICKLE CELL BETA THALASSEMIA: Primary | ICD-10-CM

## 2018-12-27 LAB
ERYTHROCYTE [DISTWIDTH] IN BLOOD BY AUTOMATED COUNT: 19.2 %
HCT VFR BLD AUTO: 26.5 %
HGB BLD-MCNC: 8.8 G/DL
MCH RBC QN AUTO: 21.7 PG
MCHC RBC AUTO-ENTMCNC: 33.2 G/DL
MCV RBC AUTO: 65 FL
NEUTROPHILS # BLD AUTO: 4.2 K/UL
PLATELET # BLD AUTO: 503 K/UL
PMV BLD AUTO: 9 FL
RBC # BLD AUTO: 4.06 M/UL
WBC # BLD AUTO: 8.87 K/UL

## 2018-12-27 PROCEDURE — 99213 OFFICE O/P EST LOW 20 MIN: CPT | Mod: S$PBB,,, | Performed by: INTERNAL MEDICINE

## 2018-12-27 PROCEDURE — 99213 OFFICE O/P EST LOW 20 MIN: CPT | Mod: PBBFAC | Performed by: INTERNAL MEDICINE

## 2018-12-27 PROCEDURE — 99999 PR PBB SHADOW E&M-EST. PATIENT-LVL III: CPT | Mod: PBBFAC,,, | Performed by: INTERNAL MEDICINE

## 2018-12-27 PROCEDURE — 85027 COMPLETE CBC AUTOMATED: CPT

## 2018-12-27 PROCEDURE — 36415 COLL VENOUS BLD VENIPUNCTURE: CPT

## 2018-12-27 RX ORDER — FLUTICASONE PROPIONATE 110 UG/1
1 AEROSOL, METERED RESPIRATORY (INHALATION) 2 TIMES DAILY
COMMUNITY

## 2019-03-25 ENCOUNTER — OFFICE VISIT (OUTPATIENT)
Dept: HEMATOLOGY/ONCOLOGY | Facility: CLINIC | Age: 34
End: 2019-03-25
Attending: INTERNAL MEDICINE
Payer: MEDICAID

## 2019-03-25 ENCOUNTER — LAB VISIT (OUTPATIENT)
Dept: LAB | Facility: OTHER | Age: 34
End: 2019-03-25
Attending: INTERNAL MEDICINE
Payer: MEDICAID

## 2019-03-25 VITALS
BODY MASS INDEX: 24.38 KG/M2 | SYSTOLIC BLOOD PRESSURE: 123 MMHG | DIASTOLIC BLOOD PRESSURE: 80 MMHG | WEIGHT: 132.5 LBS | OXYGEN SATURATION: 100 % | HEIGHT: 62 IN | HEART RATE: 77 BPM | TEMPERATURE: 99 F

## 2019-03-25 DIAGNOSIS — D57.40 SICKLE CELL BETA THALASSEMIA: ICD-10-CM

## 2019-03-25 DIAGNOSIS — D57.40 SICKLE CELL BETA THALASSEMIA: Primary | ICD-10-CM

## 2019-03-25 LAB
ERYTHROCYTE [DISTWIDTH] IN BLOOD BY AUTOMATED COUNT: 19.3 % (ref 11.5–14.5)
FERRITIN SERPL-MCNC: 151 NG/ML (ref 20–300)
HCT VFR BLD AUTO: 27 % (ref 37–48.5)
HGB BLD-MCNC: 9.1 G/DL (ref 12–16)
MCH RBC QN AUTO: 22 PG (ref 27–31)
MCHC RBC AUTO-ENTMCNC: 33.7 G/DL (ref 32–36)
MCV RBC AUTO: 65 FL (ref 82–98)
NEUTROPHILS # BLD AUTO: 4.3 K/UL (ref 1.8–7.7)
PLATELET # BLD AUTO: 490 K/UL (ref 150–350)
PMV BLD AUTO: 9.1 FL (ref 9.2–12.9)
RBC # BLD AUTO: 4.13 M/UL (ref 4–5.4)
WBC # BLD AUTO: 9.44 K/UL (ref 3.9–12.7)

## 2019-03-25 PROCEDURE — 85027 COMPLETE CBC AUTOMATED: CPT

## 2019-03-25 PROCEDURE — 99213 OFFICE O/P EST LOW 20 MIN: CPT | Mod: PBBFAC | Performed by: INTERNAL MEDICINE

## 2019-03-25 PROCEDURE — 82728 ASSAY OF FERRITIN: CPT

## 2019-03-25 PROCEDURE — 99999 PR PBB SHADOW E&M-EST. PATIENT-LVL III: ICD-10-PCS | Mod: PBBFAC,,, | Performed by: INTERNAL MEDICINE

## 2019-03-25 PROCEDURE — 99213 OFFICE O/P EST LOW 20 MIN: CPT | Mod: S$PBB,,, | Performed by: INTERNAL MEDICINE

## 2019-03-25 PROCEDURE — 36415 COLL VENOUS BLD VENIPUNCTURE: CPT

## 2019-03-25 PROCEDURE — 99999 PR PBB SHADOW E&M-EST. PATIENT-LVL III: CPT | Mod: PBBFAC,,, | Performed by: INTERNAL MEDICINE

## 2019-03-25 PROCEDURE — 99213 PR OFFICE/OUTPT VISIT, EST, LEVL III, 20-29 MIN: ICD-10-PCS | Mod: S$PBB,,, | Performed by: INTERNAL MEDICINE

## 2019-03-26 ENCOUNTER — TELEPHONE (OUTPATIENT)
Dept: HEMATOLOGY/ONCOLOGY | Facility: CLINIC | Age: 34
End: 2019-03-26

## 2019-03-26 NOTE — TELEPHONE ENCOUNTER
----- Message from Jeff Harrison MD sent at 3/26/2019  8:25 AM CDT -----  Let her know that her iron level was normal.  See if he can get the phone number for her GYN so we can fax my note to her.

## 2019-03-26 NOTE — TELEPHONE ENCOUNTER
Called and spoke with Ms Saba. Per Dr Harrison. Informed Ms Saba, Dr Harrison reviewed her lab results from yesterday and her Iron level was normal. Dr Harrison wish to forward his note to her GYN  and he need the name and phone number. The physician name is Dr Jo-Ann Mercado @ [phone], fax number [phone]

## 2019-09-25 ENCOUNTER — LAB VISIT (OUTPATIENT)
Dept: LAB | Facility: OTHER | Age: 34
End: 2019-09-25
Attending: INTERNAL MEDICINE
Payer: MEDICAID

## 2019-09-25 DIAGNOSIS — D57.40 SICKLE CELL BETA THALASSEMIA: ICD-10-CM

## 2019-09-25 LAB
ERYTHROCYTE [DISTWIDTH] IN BLOOD BY AUTOMATED COUNT: 17.7 % (ref 11.5–14.5)
HCT VFR BLD AUTO: 26.6 % (ref 37–48.5)
HGB BLD-MCNC: 9.3 G/DL (ref 12–16)
IMM GRANULOCYTES # BLD AUTO: 0.02 K/UL (ref 0–0.04)
MCH RBC QN AUTO: 22 PG (ref 27–31)
MCHC RBC AUTO-ENTMCNC: 35 G/DL (ref 32–36)
MCV RBC AUTO: 63 FL (ref 82–98)
NEUTROPHILS # BLD AUTO: 4.9 K/UL (ref 1.8–7.7)
PLATELET # BLD AUTO: 410 K/UL (ref 150–350)
PMV BLD AUTO: 9.5 FL (ref 9.2–12.9)
RBC # BLD AUTO: 4.23 M/UL (ref 4–5.4)
WBC # BLD AUTO: 9.09 K/UL (ref 3.9–12.7)

## 2019-09-25 PROCEDURE — 36415 COLL VENOUS BLD VENIPUNCTURE: CPT

## 2019-09-25 PROCEDURE — 85027 COMPLETE CBC AUTOMATED: CPT

## 2019-09-26 DIAGNOSIS — D57.40 SICKLE CELL BETA THALASSEMIA: Primary | ICD-10-CM

## 2019-09-27 ENCOUNTER — TELEPHONE (OUTPATIENT)
Dept: HEMATOLOGY/ONCOLOGY | Facility: CLINIC | Age: 34
End: 2019-09-27

## 2019-09-27 NOTE — TELEPHONE ENCOUNTER
----- Message from Jeff Harrison MD sent at 9/25/2019  4:46 PM CDT -----  Let her know the labs were stable.

## 2019-09-27 NOTE — TELEPHONE ENCOUNTER
Call made to patient. No answer. Values from lab results left for patient and advised patient to call back to clinic if she has any questions about these values.      ----- Message from Clau Stacy sent at 9/25/2019  3:53 PM CDT -----  Patient rescheduled her appointment to December. Says she can't make her scheduled appointment on Monday, September 30th. Would like call back with her results from her lab from today. She can be reached at [phone]

## 2019-12-23 ENCOUNTER — OFFICE VISIT (OUTPATIENT)
Dept: HEMATOLOGY/ONCOLOGY | Facility: CLINIC | Age: 34
End: 2019-12-23
Attending: INTERNAL MEDICINE
Payer: COMMERCIAL

## 2019-12-23 ENCOUNTER — LAB VISIT (OUTPATIENT)
Dept: LAB | Facility: OTHER | Age: 34
End: 2019-12-23
Attending: INTERNAL MEDICINE
Payer: COMMERCIAL

## 2019-12-23 VITALS
HEART RATE: 93 BPM | TEMPERATURE: 99 F | HEIGHT: 62 IN | WEIGHT: 126.56 LBS | BODY MASS INDEX: 23.29 KG/M2 | DIASTOLIC BLOOD PRESSURE: 67 MMHG | SYSTOLIC BLOOD PRESSURE: 121 MMHG | OXYGEN SATURATION: 98 % | RESPIRATION RATE: 16 BRPM

## 2019-12-23 DIAGNOSIS — D57.40 SICKLE CELL BETA THALASSEMIA: ICD-10-CM

## 2019-12-23 DIAGNOSIS — D75.839 THROMBOCYTOSIS: ICD-10-CM

## 2019-12-23 DIAGNOSIS — D57.40 SICKLE CELL BETA THALASSEMIA: Primary | ICD-10-CM

## 2019-12-23 LAB
ERYTHROCYTE [DISTWIDTH] IN BLOOD BY AUTOMATED COUNT: 17.9 % (ref 11.5–14.5)
HCT VFR BLD AUTO: 28.4 % (ref 37–48.5)
HGB BLD-MCNC: 9.6 G/DL (ref 12–16)
IMM GRANULOCYTES # BLD AUTO: 0.06 K/UL (ref 0–0.04)
MCH RBC QN AUTO: 21.9 PG (ref 27–31)
MCHC RBC AUTO-ENTMCNC: 33.8 G/DL (ref 32–36)
MCV RBC AUTO: 65 FL (ref 82–98)
NEUTROPHILS # BLD AUTO: 12.6 K/UL (ref 1.8–7.7)
PLATELET # BLD AUTO: 480 K/UL (ref 150–350)
PMV BLD AUTO: 10 FL (ref 9.2–12.9)
RBC # BLD AUTO: 4.38 M/UL (ref 4–5.4)
WBC # BLD AUTO: 17.4 K/UL (ref 3.9–12.7)

## 2019-12-23 PROCEDURE — 99213 PR OFFICE/OUTPT VISIT, EST, LEVL III, 20-29 MIN: ICD-10-PCS | Mod: S$GLB,,, | Performed by: INTERNAL MEDICINE

## 2019-12-23 PROCEDURE — 3008F PR BODY MASS INDEX (BMI) DOCUMENTED: ICD-10-PCS | Mod: CPTII,S$GLB,, | Performed by: INTERNAL MEDICINE

## 2019-12-23 PROCEDURE — 99999 PR PBB SHADOW E&M-EST. PATIENT-LVL III: CPT | Mod: PBBFAC,,, | Performed by: INTERNAL MEDICINE

## 2019-12-23 PROCEDURE — 99999 PR PBB SHADOW E&M-EST. PATIENT-LVL III: ICD-10-PCS | Mod: PBBFAC,,, | Performed by: INTERNAL MEDICINE

## 2019-12-23 PROCEDURE — 99213 OFFICE O/P EST LOW 20 MIN: CPT | Mod: S$GLB,,, | Performed by: INTERNAL MEDICINE

## 2019-12-23 PROCEDURE — 85027 COMPLETE CBC AUTOMATED: CPT

## 2019-12-23 PROCEDURE — 36415 COLL VENOUS BLD VENIPUNCTURE: CPT

## 2019-12-23 PROCEDURE — 3008F BODY MASS INDEX DOCD: CPT | Mod: CPTII,S$GLB,, | Performed by: INTERNAL MEDICINE

## 2019-12-23 NOTE — PROGRESS NOTES
Subjective:       Patient ID: Shi Saba is a 34 y.o. female.    Chief Complaint: No chief complaint on file.    HPI Mrs Saba is a 34-year-old -American female with a history of sickle cell, beta thalassemia.  That has been associated with mild chronic anemia.  She also had an episode of significant thrombocytosis of greater than 1 million after delivering a baby in January of 2017 which resolved spontaneously.   Today she reports that she has been feeling more fatigued recently.  She has also had some intermittent shortness of breath.  She has had some musculoskeletal pains and pains in her joints.  She did a 21 day fast and lost some weight with that.    Review of Systems   Constitutional: Positive for fatigue. Negative for activity change and appetite change.   Respiratory: Negative for cough and shortness of breath.    Cardiovascular: Negative for chest pain.   Gastrointestinal: Positive for abdominal pain. Negative for constipation and diarrhea.   Genitourinary: Negative for menstrual problem.   Neurological: Negative.    Psychiatric/Behavioral: Negative for dysphoric mood. The patient is not nervous/anxious.        Objective:      Physical Exam   Constitutional: She is oriented to person, place, and time. She appears well-developed and well-nourished.   HENT:   Mouth/Throat: No oropharyngeal exudate.   Eyes: No scleral icterus.   Cardiovascular: Normal rate and regular rhythm.   Pulmonary/Chest: Breath sounds normal. No respiratory distress. She has no wheezes.   Abdominal: Soft. There is no tenderness.   Musculoskeletal: She exhibits no edema.   Lymphadenopathy:     She has no cervical adenopathy.   Neurological: She is alert and oriented to person, place, and time.   Psychiatric: She has a normal mood and affect. Her behavior is normal. Thought content normal.   Vitals reviewed.      Assessment:     CBC shows [place] [address], hemoglobin 9.6 and platelet count  480,000.  1. Sickle cell beta thalassemia    2. Thrombocytosis        Plan:       Recheck CBC in 2 weeks  Return to clinic in 6 months with labs-CBC.

## 2020-01-13 ENCOUNTER — TELEPHONE (OUTPATIENT)
Dept: HEMATOLOGY/ONCOLOGY | Facility: CLINIC | Age: 35
End: 2020-01-13

## 2020-01-13 NOTE — TELEPHONE ENCOUNTER
spoke with pt on today in regards to rescheduling lab appointment, pt is aware of new appointment.

## 2020-01-15 ENCOUNTER — LAB VISIT (OUTPATIENT)
Dept: LAB | Facility: HOSPITAL | Age: 35
End: 2020-01-15
Attending: INTERNAL MEDICINE
Payer: COMMERCIAL

## 2020-01-15 DIAGNOSIS — D57.40 SICKLE CELL BETA THALASSEMIA: ICD-10-CM

## 2020-01-15 LAB
BASOPHILS # BLD AUTO: 0.1 K/UL (ref 0–0.2)
BASOPHILS NFR BLD: 1 % (ref 0–1.9)
DIFFERENTIAL METHOD: ABNORMAL
EOSINOPHIL # BLD AUTO: 0.3 K/UL (ref 0–0.5)
EOSINOPHIL NFR BLD: 2.9 % (ref 0–8)
ERYTHROCYTE [DISTWIDTH] IN BLOOD BY AUTOMATED COUNT: 17.6 % (ref 11.5–14.5)
HCT VFR BLD AUTO: 27.5 % (ref 37–48.5)
HGB BLD-MCNC: 9.1 G/DL (ref 12–16)
IMM GRANULOCYTES # BLD AUTO: 0.03 K/UL (ref 0–0.04)
IMM GRANULOCYTES NFR BLD AUTO: 0.3 % (ref 0–0.5)
LYMPHOCYTES # BLD AUTO: 2.7 K/UL (ref 1–4.8)
LYMPHOCYTES NFR BLD: 27.8 % (ref 18–48)
MCH RBC QN AUTO: 22.1 PG (ref 27–31)
MCHC RBC AUTO-ENTMCNC: 33.1 G/DL (ref 32–36)
MCV RBC AUTO: 67 FL (ref 82–98)
MONOCYTES # BLD AUTO: 0.8 K/UL (ref 0.3–1)
MONOCYTES NFR BLD: 8.8 % (ref 4–15)
NEUTROPHILS # BLD AUTO: 5.6 K/UL (ref 1.8–7.7)
NEUTROPHILS NFR BLD: 59.2 % (ref 38–73)
NRBC BLD-RTO: 1 /100 WBC
PLATELET # BLD AUTO: 536 K/UL (ref 150–350)
PMV BLD AUTO: 10.5 FL (ref 9.2–12.9)
RBC # BLD AUTO: 4.12 M/UL (ref 4–5.4)
WBC # BLD AUTO: 9.53 K/UL (ref 3.9–12.7)

## 2020-01-15 PROCEDURE — 85025 COMPLETE CBC W/AUTO DIFF WBC: CPT

## 2020-01-15 PROCEDURE — 36415 COLL VENOUS BLD VENIPUNCTURE: CPT | Mod: PN

## 2020-01-16 ENCOUNTER — TELEPHONE (OUTPATIENT)
Dept: HEMATOLOGY/ONCOLOGY | Facility: CLINIC | Age: 35
End: 2020-01-16

## 2020-01-16 NOTE — TELEPHONE ENCOUNTER
Called Ms Saba per Dr Harrison. No answer, message left on voice mail. Informed Ms Saba in her message, Dr Harrison reviewed the results of her recent labs and he states Ms Saba's white blood cell count is back to normal.

## 2020-01-16 NOTE — TELEPHONE ENCOUNTER
----- Message from Jeff Harrison MD sent at 1/16/2020  8:23 AM CST -----  Let her know that her white blood cell count is back to normal

## 2020-04-15 ENCOUNTER — TELEPHONE (OUTPATIENT)
Dept: HEMATOLOGY/ONCOLOGY | Facility: CLINIC | Age: 35
End: 2020-04-15

## 2020-04-15 NOTE — TELEPHONE ENCOUNTER
Returned call to pt.   Pt stated she needs refill on her Morphine rx.   Informed pt that not seeing on med card and asked if could provide dosing she has at home- pt unable to clarify.   Informed pt would discuss with Dr. Harrison and f/u.   Pt verbalized understanding.     Message fwd.         ----- Message from Bruna Johnson sent at 4/15/2020  4:01 PM CDT -----  Contact: RICKY MEEKS [[id number]]  Name of Who is Calling: RICKY MEEKS [[id number]]      What is the request in detail: Pt is calling to speak to staff in regards to having a script refilled .... Please call to further assist .       Can the clinic reply by [facility]: N      What Number to Call Back if not in [facility]: [phone]

## 2020-06-11 ENCOUNTER — TELEPHONE (OUTPATIENT)
Dept: HEMATOLOGY/ONCOLOGY | Facility: CLINIC | Age: 35
End: 2020-06-11

## 2020-06-11 NOTE — TELEPHONE ENCOUNTER
Returned call to pt.  Pt stated recently got d/c'd from hospital.  Pt stated had labs done with her PCP (PCP is with [facility] and said that her labs can still be reviewed in Epic) and plt count was > 1000.   Informed pt would see if Dr. Harrison's NP could review and advise but looked like due for f/u around 6/23 regardless and would send message to [facility]  to set up.  Pt verbalized understanding and thanked nurse.      Message fwd.             ----- Message from Rianna Camarillo sent at 6/11/2020 10:16 AM CDT -----  Contact: RICKY MEEKS   Name of Who is Calling: RICKY MEEKS       What is the request in detail: Patient is requesting a call back concerning lab results she received from her pcp and her Platelets  count was over 1000 she was hospitalized from 05/26-until 06/04 and she would like discuss some things       Can the clinic reply by [facility]: yes      What Number to Call Back if not in [facility]: [phone]

## 2020-06-11 NOTE — TELEPHONE ENCOUNTER
Called pt and informed of below:  Leighann Ortega, YANET Olivarez   Cc: Clau Stacy   Caller: Unspecified (Today, 10:29 AM)             I can not see any records in care everywhere that shows an elevated platelet count.     Without records, I can not discuss any of her concerns.     Apologies. If you can find them, please let me know      Pt to call PCP office and have labs faxed to [phone].   Pt verbalized understanding.

## 2020-06-17 ENCOUNTER — TELEPHONE (OUTPATIENT)
Dept: HEMATOLOGY/ONCOLOGY | Facility: CLINIC | Age: 35
End: 2020-06-17

## 2020-06-17 NOTE — TELEPHONE ENCOUNTER
"Call made to patient to inform not sure who called her, but to please call back to clinic if she needs anything else.     ----- Message from Aneta Schaefer sent at 6/17/2020  3:10 PM CDT -----  Regarding: returning the office call  Name of Who is Calling: Shi Saba      What is the request in detail:   Patient called stating, "she's returning your call and would like you to please call again."     thanks!      Reply by [facility]: no      Call Back : [phone]                                          "

## 2020-06-20 DIAGNOSIS — D75.839 THROMBOCYTOSIS: Primary | ICD-10-CM

## 2020-06-22 ENCOUNTER — LAB VISIT (OUTPATIENT)
Dept: LAB | Facility: OTHER | Age: 35
End: 2020-06-22
Attending: INTERNAL MEDICINE
Payer: COMMERCIAL

## 2020-06-22 ENCOUNTER — TELEPHONE (OUTPATIENT)
Dept: HEMATOLOGY/ONCOLOGY | Facility: CLINIC | Age: 35
End: 2020-06-22

## 2020-06-22 DIAGNOSIS — D75.839 THROMBOCYTOSIS: ICD-10-CM

## 2020-06-22 LAB
BASOPHILS # BLD AUTO: 0.1 K/UL (ref 0–0.2)
BASOPHILS NFR BLD: 1.3 % (ref 0–1.9)
DIFFERENTIAL METHOD: ABNORMAL
EOSINOPHIL # BLD AUTO: 0.1 K/UL (ref 0–0.5)
EOSINOPHIL NFR BLD: 1.5 % (ref 0–8)
ERYTHROCYTE [DISTWIDTH] IN BLOOD BY AUTOMATED COUNT: 19 % (ref 11.5–14.5)
FERRITIN SERPL-MCNC: 147 NG/ML (ref 20–300)
HCT VFR BLD AUTO: 29.8 % (ref 37–48.5)
HGB BLD-MCNC: 9.6 G/DL (ref 12–16)
IMM GRANULOCYTES # BLD AUTO: 0.01 K/UL (ref 0–0.04)
IMM GRANULOCYTES NFR BLD AUTO: 0.1 % (ref 0–0.5)
LYMPHOCYTES # BLD AUTO: 3 K/UL (ref 1–4.8)
LYMPHOCYTES NFR BLD: 39.8 % (ref 18–48)
MCH RBC QN AUTO: 21.4 PG (ref 27–31)
MCHC RBC AUTO-ENTMCNC: 32.2 G/DL (ref 32–36)
MCV RBC AUTO: 67 FL (ref 82–98)
MONOCYTES # BLD AUTO: 0.7 K/UL (ref 0.3–1)
MONOCYTES NFR BLD: 9.2 % (ref 4–15)
NEUTROPHILS # BLD AUTO: 3.6 K/UL (ref 1.8–7.7)
NEUTROPHILS NFR BLD: 48.1 % (ref 38–73)
NRBC BLD-RTO: 6 /100 WBC
PLATELET # BLD AUTO: 723 K/UL (ref 150–350)
PMV BLD AUTO: 8.8 FL (ref 9.2–12.9)
RBC # BLD AUTO: 4.48 M/UL (ref 4–5.4)
WBC # BLD AUTO: 7.47 K/UL (ref 3.9–12.7)

## 2020-06-22 PROCEDURE — 85025 COMPLETE CBC W/AUTO DIFF WBC: CPT

## 2020-06-22 PROCEDURE — 36415 COLL VENOUS BLD VENIPUNCTURE: CPT

## 2020-06-22 PROCEDURE — 81219 CALR GENE COM VARIANTS: CPT

## 2020-06-22 PROCEDURE — 81403 MOPATH PROCEDURE LEVEL 4: CPT

## 2020-06-22 PROCEDURE — 82728 ASSAY OF FERRITIN: CPT

## 2020-06-22 NOTE — TELEPHONE ENCOUNTER
Called and spoke with Ms Saba. Informed Ms Saba, Dr Harrison wish to repeat blood work this week. I called to ask what day works best for her to scheduled the appointment. Ms Saba states she will be available tomorrow morning. Done. Appointment scheduled at [facility]. Ms Saba given appointment time.

## 2020-06-22 NOTE — TELEPHONE ENCOUNTER
----- Message from Jeff Harrison MD sent at 6/20/2020 10:53 AM CDT -----  Needs repeat bloood work next week - orders in

## 2020-06-25 LAB
CALR FINAL DIAGNOSIS: NORMAL
CALR SPECIMEN TYPE: NORMAL

## 2020-06-26 ENCOUNTER — TELEPHONE (OUTPATIENT)
Dept: HEMATOLOGY/ONCOLOGY | Facility: CLINIC | Age: 35
End: 2020-06-26

## 2020-06-26 DIAGNOSIS — D57.40 SICKLE CELL BETA THALASSEMIA: Primary | ICD-10-CM

## 2020-06-26 RX ORDER — OXYCODONE HYDROCHLORIDE 5 MG/1
5 TABLET ORAL EVERY 8 HOURS PRN
Qty: 20 TABLET | Refills: 0 | Status: SHIPPED | OUTPATIENT
Start: 2020-06-26 | End: 2021-06-26

## 2020-06-26 NOTE — TELEPHONE ENCOUNTER
Spoke with patient who informed she is still having pain in both of her legs since her last SSC. She is asking could we send in something to help her with the pain. Informed Dr Harrison was out of clinic but I would speak with Leighann about this to see if something could be sent in. Patient grateful for any help.  .      ----- Message from Meredith Araya sent at 6/26/2020  3:26 PM CDT -----  Regarding: Call Back  Name of Who is Calling : RICKY MEEKS [[id number]]    Patient is requesting a call from staff in regards to her legs still hurting would like to speak with staff as soon as possible   .....Please contact to further discuss and advise.    Can the clinic reply by [facility] : No    What Number to Call Back :  [phone]

## 2020-06-26 NOTE — TELEPHONE ENCOUNTER
Call made to patient to discuss results. No answer at this time. Will reach out again and make Dr Harrison aware patient is needing clarification on her lab results.     ----- Message from Meredith Araya sent at 6/26/2020 10:44 AM CDT -----  Regarding: Call Back  Name of Who is Calling : RICKY MEEKS [[id number]]    Patient is requesting a call from staff in regards to lab results needing some clarity   .....Please contact to further discuss and advise.    Can the clinic reply by [facility] : No    What Number to Call Back :  [phone]

## 2020-07-13 LAB
GENETICIST REVIEW: NORMAL
PATHOLOGIST NAME: NORMAL

## 2020-07-14 ENCOUNTER — TELEPHONE (OUTPATIENT)
Dept: HEMATOLOGY/ONCOLOGY | Facility: CLINIC | Age: 35
End: 2020-07-14

## 2020-07-14 NOTE — TELEPHONE ENCOUNTER
----- Message from Jeff Harrison MD sent at 7/13/2020  4:29 PM CDT -----  Let her know that all of the additional tests we did to look for an underlying cause of her elevated platelet count came back normal - nothing else needs to be done at this time

## 2020-07-14 NOTE — TELEPHONE ENCOUNTER
Called and spoke with Ms Saba, per Dr Harrison. Informed Ms Saba, Dr Harrison reviewed the results of her additional tests done to look for an underlying cause of her elevated platelet count, came back normal. At this time nothing else needs to be done. Ms Saba voiced verbal understanding.

## 2020-08-11 DIAGNOSIS — H66.92 OTITIS OF LEFT EAR: Primary | ICD-10-CM

## 2020-08-11 RX ORDER — AMOXICILLIN 500 MG/1
500 CAPSULE ORAL 3 TIMES DAILY
Qty: 21 CAPSULE | Refills: 0 | Status: SHIPPED | OUTPATIENT
Start: 2020-08-11 | End: 2020-08-21

## 2020-09-26 ENCOUNTER — PATIENT MESSAGE (OUTPATIENT)
Dept: HEMATOLOGY/ONCOLOGY | Facility: CLINIC | Age: 35
End: 2020-09-26

## 2020-09-29 ENCOUNTER — LAB VISIT (OUTPATIENT)
Dept: LAB | Facility: HOSPITAL | Age: 35
End: 2020-09-29
Attending: INTERNAL MEDICINE
Payer: COMMERCIAL

## 2020-09-29 DIAGNOSIS — D57.40 SICKLE CELL BETA THALASSEMIA: ICD-10-CM

## 2020-09-29 LAB
ERYTHROCYTE [DISTWIDTH] IN BLOOD BY AUTOMATED COUNT: 17.7 % (ref 11.5–14.5)
HCT VFR BLD AUTO: 29.2 % (ref 37–48.5)
HGB BLD-MCNC: 9.7 G/DL (ref 12–16)
IMM GRANULOCYTES # BLD AUTO: 0.03 K/UL (ref 0–0.04)
MCH RBC QN AUTO: 21.9 PG (ref 27–31)
MCHC RBC AUTO-ENTMCNC: 33.2 G/DL (ref 32–36)
MCV RBC AUTO: 66 FL (ref 82–98)
NEUTROPHILS # BLD AUTO: 4.6 K/UL (ref 1.8–7.7)
PLATELET # BLD AUTO: 447 K/UL (ref 150–350)
PMV BLD AUTO: 9.6 FL (ref 9.2–12.9)
RBC # BLD AUTO: 4.43 M/UL (ref 4–5.4)
RETICS/RBC NFR AUTO: 4.1 % (ref 0.5–2.5)
WBC # BLD AUTO: 9.47 K/UL (ref 3.9–12.7)

## 2020-09-29 PROCEDURE — 36415 COLL VENOUS BLD VENIPUNCTURE: CPT

## 2020-09-29 PROCEDURE — 85027 COMPLETE CBC AUTOMATED: CPT

## 2020-09-29 PROCEDURE — 85045 AUTOMATED RETICULOCYTE COUNT: CPT

## 2020-09-30 PROBLEM — D75.839 THROMBOCYTOSIS: Status: ACTIVE | Noted: 2020-09-30

## 2020-10-01 ENCOUNTER — OFFICE VISIT (OUTPATIENT)
Dept: HEMATOLOGY/ONCOLOGY | Facility: CLINIC | Age: 35
End: 2020-10-01
Attending: INTERNAL MEDICINE
Payer: COMMERCIAL

## 2020-10-01 DIAGNOSIS — D57.40 SICKLE CELL BETA THALASSEMIA: Primary | ICD-10-CM

## 2020-10-01 PROCEDURE — 99212 PR OFFICE/OUTPT VISIT, EST, LEVL II, 10-19 MIN: ICD-10-PCS | Mod: 95,,, | Performed by: INTERNAL MEDICINE

## 2020-10-01 PROCEDURE — 99212 OFFICE O/P EST SF 10 MIN: CPT | Mod: 95,,, | Performed by: INTERNAL MEDICINE

## 2020-10-01 NOTE — PROGRESS NOTES
Subjective:       Patient ID: Shi Saba is a 35 y.o. female.    Chief Complaint: No chief complaint on file.    HPI Mrs Saba is a 35-year-old -American female with a history of sickle cell, beta thalassemia.  That has been associated with mild chronic anemia.  She also had an episode of significant thrombocytosis of greater than 1 million after delivering a baby in January of 2017 which resolved spontaneously.     The patient location is: workplace.  The chief complaint leading to consultation is: sickle beta thal and thrombocytosis -reactive.    Visit type: audiovisual    Face to Face time with patient: 5 minutes.  10 minutes of total time spent on the encounter, which includes face to face time and non-face to face time preparing to see the patient (eg, review of tests), Obtaining and/or reviewing separately obtained history, Documenting clinical information in the electronic or other health record, Independently interpreting results (not separately reported) and communicating results to the patient/family/caregiver, or Care coordination (not separately reported).         Each patient to whom he or she provides medical services by telemedicine is:  (1) informed of the relationship between the physician and patient and the respective role of any other health care provider with respect to management of the patient; and (2) notified that he or she may decline to receive medical services by telemedicine and may withdraw from such care at any time.    Notes:  Today she reports no specific new issues.        Review of Systems   Constitutional: Negative for activity change and appetite change.   Neurological: Negative.    Psychiatric/Behavioral: Negative for dysphoric mood. The patient is not nervous/anxious.        Objective:      Physical Exam  Vitals signs reviewed.   Constitutional:       General: She is not in acute distress.     Appearance: Normal appearance. She is well-developed.    Neurological:      Mental Status: She is alert.   Psychiatric:         Mood and Affect: Mood normal.         Behavior: Behavior normal.         Thought Content: Thought content normal.         Assessment:     CBC shows a HGB 9.7 and plts 099930  1. Sickle cell beta thalassemia      2.  Reactive thrombocytosis  Plan:         Return to clinic in 6 months with labs-CBC.

## 2020-11-27 ENCOUNTER — PATIENT MESSAGE (OUTPATIENT)
Dept: HEMATOLOGY/ONCOLOGY | Facility: CLINIC | Age: 35
End: 2020-11-27

## 2020-11-27 ENCOUNTER — TELEPHONE (OUTPATIENT)
Dept: HEMATOLOGY/ONCOLOGY | Facility: CLINIC | Age: 35
End: 2020-11-27

## 2020-11-27 ENCOUNTER — HOSPITAL ENCOUNTER (EMERGENCY)
Facility: OTHER | Age: 35
Discharge: HOME | End: 2020-11-27
Attending: EMERGENCY MEDICINE
Payer: COMMERCIAL

## 2020-11-27 VITALS
HEIGHT: 62 IN | SYSTOLIC BLOOD PRESSURE: 141 MMHG | DIASTOLIC BLOOD PRESSURE: 89 MMHG | RESPIRATION RATE: 19 BRPM | HEART RATE: 65 BPM | TEMPERATURE: 99 F | WEIGHT: 128 LBS | OXYGEN SATURATION: 100 % | BODY MASS INDEX: 23.55 KG/M2

## 2020-11-27 DIAGNOSIS — D57.00 SICKLE CELL ANEMIA WITH CRISIS: Primary | ICD-10-CM

## 2020-11-27 LAB
ALBUMIN SERPL BCP-MCNC: 4.3 G/DL (ref 3.5–5.2)
ALP SERPL-CCNC: 64 U/L (ref 55–135)
ALT SERPL W/O P-5'-P-CCNC: 11 U/L (ref 10–44)
ANION GAP SERPL CALC-SCNC: 6 MMOL/L (ref 8–16)
AST SERPL-CCNC: 18 U/L (ref 10–40)
B-HCG UR QL: NEGATIVE
BASOPHILS # BLD AUTO: 0.1 K/UL (ref 0–0.2)
BASOPHILS NFR BLD: 0.9 % (ref 0–1.9)
BILIRUB SERPL-MCNC: 0.8 MG/DL (ref 0.1–1)
BILIRUB UR QL STRIP: NEGATIVE
BUN SERPL-MCNC: 9 MG/DL (ref 6–20)
CALCIUM SERPL-MCNC: 9.1 MG/DL (ref 8.7–10.5)
CHLORIDE SERPL-SCNC: 110 MMOL/L (ref 95–110)
CLARITY UR: CLEAR
CO2 SERPL-SCNC: 28 MMOL/L (ref 23–29)
COLOR UR: YELLOW
CREAT SERPL-MCNC: 1 MG/DL (ref 0.5–1.4)
CTP QC/QA: YES
CTP QC/QA: YES
DIFFERENTIAL METHOD: ABNORMAL
EOSINOPHIL # BLD AUTO: 0.2 K/UL (ref 0–0.5)
EOSINOPHIL NFR BLD: 2.2 % (ref 0–8)
ERYTHROCYTE [DISTWIDTH] IN BLOOD BY AUTOMATED COUNT: 18.6 % (ref 11.5–14.5)
EST. GFR  (AFRICAN AMERICAN): >60 ML/MIN/1.73 M^2
EST. GFR  (NON AFRICAN AMERICAN): >60 ML/MIN/1.73 M^2
GLUCOSE SERPL-MCNC: 114 MG/DL (ref 70–110)
GLUCOSE UR QL STRIP: NEGATIVE
HCT VFR BLD AUTO: 29.4 % (ref 37–48.5)
HCV AB SERPL QL IA: NEGATIVE
HGB BLD-MCNC: 9.9 G/DL (ref 12–16)
HGB UR QL STRIP: NEGATIVE
HIV 1+2 AB+HIV1 P24 AG SERPL QL IA: NEGATIVE
IMM GRANULOCYTES # BLD AUTO: 0.03 K/UL (ref 0–0.04)
IMM GRANULOCYTES NFR BLD AUTO: 0.3 % (ref 0–0.5)
KETONES UR QL STRIP: NEGATIVE
LEUKOCYTE ESTERASE UR QL STRIP: NEGATIVE
LYMPHOCYTES # BLD AUTO: 3.9 K/UL (ref 1–4.8)
LYMPHOCYTES NFR BLD: 35.8 % (ref 18–48)
MCH RBC QN AUTO: 21.7 PG (ref 27–31)
MCHC RBC AUTO-ENTMCNC: 33.7 G/DL (ref 32–36)
MCV RBC AUTO: 64 FL (ref 82–98)
MONOCYTES # BLD AUTO: 0.8 K/UL (ref 0.3–1)
MONOCYTES NFR BLD: 7.4 % (ref 4–15)
NEUTROPHILS # BLD AUTO: 5.8 K/UL (ref 1.8–7.7)
NEUTROPHILS NFR BLD: 53.4 % (ref 38–73)
NITRITE UR QL STRIP: NEGATIVE
NRBC BLD-RTO: 2 /100 WBC
PH UR STRIP: 5 [PH] (ref 5–8)
PLATELET # BLD AUTO: 492 K/UL (ref 150–350)
PMV BLD AUTO: 10.1 FL (ref 9.2–12.9)
POTASSIUM SERPL-SCNC: 4.3 MMOL/L (ref 3.5–5.1)
PROT SERPL-MCNC: 7.5 G/DL (ref 6–8.4)
PROT UR QL STRIP: NEGATIVE
RBC # BLD AUTO: 4.57 M/UL (ref 4–5.4)
RETICS/RBC NFR AUTO: 3.6 % (ref 0.5–2.5)
SARS-COV-2 RDRP RESP QL NAA+PROBE: NEGATIVE
SODIUM SERPL-SCNC: 144 MMOL/L (ref 136–145)
SP GR UR STRIP: 1.01 (ref 1–1.03)
URN SPEC COLLECT METH UR: NORMAL
UROBILINOGEN UR STRIP-ACNC: NEGATIVE EU/DL
WBC # BLD AUTO: 10.92 K/UL (ref 3.9–12.7)

## 2020-11-27 PROCEDURE — 81025 URINE PREGNANCY TEST: CPT | Performed by: EMERGENCY MEDICINE

## 2020-11-27 PROCEDURE — 25000003 PHARM REV CODE 250: Performed by: NURSE PRACTITIONER

## 2020-11-27 PROCEDURE — 96361 HYDRATE IV INFUSION ADD-ON: CPT

## 2020-11-27 PROCEDURE — 99284 EMERGENCY DEPT VISIT MOD MDM: CPT | Mod: 25

## 2020-11-27 PROCEDURE — 80053 COMPREHEN METABOLIC PANEL: CPT

## 2020-11-27 PROCEDURE — 81003 URINALYSIS AUTO W/O SCOPE: CPT

## 2020-11-27 PROCEDURE — 63600175 PHARM REV CODE 636 W HCPCS: Performed by: NURSE PRACTITIONER

## 2020-11-27 PROCEDURE — 85045 AUTOMATED RETICULOCYTE COUNT: CPT

## 2020-11-27 PROCEDURE — 85025 COMPLETE CBC W/AUTO DIFF WBC: CPT

## 2020-11-27 PROCEDURE — 86803 HEPATITIS C AB TEST: CPT

## 2020-11-27 PROCEDURE — 96374 THER/PROPH/DIAG INJ IV PUSH: CPT

## 2020-11-27 PROCEDURE — 86703 HIV-1/HIV-2 1 RESULT ANTBDY: CPT

## 2020-11-27 PROCEDURE — U0002 COVID-19 LAB TEST NON-CDC: HCPCS | Performed by: EMERGENCY MEDICINE

## 2020-11-27 RX ORDER — MORPHINE SULFATE 4 MG/ML
4 INJECTION, SOLUTION INTRAMUSCULAR; INTRAVENOUS ONCE
Status: COMPLETED | OUTPATIENT
Start: 2020-11-27 | End: 2020-11-27

## 2020-11-27 RX ADMIN — SODIUM CHLORIDE 1000 ML: 0.9 INJECTION, SOLUTION INTRAVENOUS at 11:11

## 2020-11-27 RX ADMIN — MORPHINE SULFATE 4 MG: 4 INJECTION, SOLUTION INTRAMUSCULAR; INTRAVENOUS at 11:11

## 2020-11-27 NOTE — ED NOTES
Chief Complaint   Patient presents with    Leg Pain     +LLE pain x2 days, PMH of sickle cell disease. No relief from meds at home.     Patient presents to the ED with c/o pain from left hip to left knee.  Patient states hx of sickle cell.  Patient has palpable left pedal pulse.  Patient is AOx4, respirations even, unlabored.  Patient on continuous cardiac monitor, automatic BP cuff, and pulse oximeter.

## 2020-11-27 NOTE — DISCHARGE INSTRUCTIONS
Make sure to drink lots of water over the next few days, and called schedule an appointment with your primary care physician.  If your symptoms return or you have any worsening symptoms or any other concerns please return to emergency department any time.

## 2020-11-27 NOTE — ED PROVIDER NOTES
Encounter Date: 2020       History     Chief Complaint   Patient presents with    Leg Pain     +LLE pain x2 days, PMH of sickle cell disease. No relief from meds at home.     This is the urgent evaluation a 35-year-old black female with a past medical history of sickle cell, beta thalassemia, and a vascular necrosis of the left hip presents to emergency department complaining of left lower extremity pain x2 days, reports she has been taking morphine 15 mg at home with no relief in pain.  States she feels like this is a flare for sickle cell.  She denies any chest pain any shortness of breath.  Patient reports pain is worse with ambulation.  She denies any fever/chills.        Review of patient's allergies indicates:   Allergen Reactions    Vicodin [hydrocodone-acetaminophen] Itching    Hydrocodone Rash     Past Medical History:   Diagnosis Date    Avascular necrosis of hip left    Encounter for blood transfusion     Environmental allergies      Past Surgical History:   Procedure Laterality Date     SECTION       No family history on file.  Social History     Tobacco Use    Smoking status: Never Smoker    Smokeless tobacco: Never Used   Substance Use Topics    Alcohol use: No     Frequency: Never    Drug use: No     Review of Systems   Constitutional: Negative for chills, fatigue and fever.   Respiratory: Negative for cough, chest tightness and shortness of breath.    Cardiovascular: Negative for chest pain.   Gastrointestinal: Negative for abdominal pain, nausea and vomiting.   Musculoskeletal: Positive for arthralgias and gait problem. Negative for back pain, myalgias and neck pain.   Neurological: Negative for headaches.   All other systems reviewed and are negative.      Physical Exam     Initial Vitals [20 0940]   BP Pulse Resp Temp SpO2   (!) 147/83 69 20 98.7 °F (37.1 °C) 98 %      MAP       --         Vitals:    20 0940 20 1101 20 1118 20 1201   BP: (!)  "147/83 137/85  (!) 141/89   Pulse: 69 64     Resp: 20  20    Temp: 98.7 °F (37.1 °C)      TempSrc: Oral      SpO2: 98% 99%  100%   Weight: 58.1 kg (128 lb)      Height: 5' 2" (1.575 m)       11/27/20 1202   BP:    Pulse: 64   Resp:    Temp:    TempSrc:    SpO2:    Weight:    Height:        Physical Exam    Nursing note and vitals reviewed.  Constitutional: Vital signs are normal. She appears well-developed and well-nourished. She does not appear ill. No distress.   Uncomfortable appearing black female   HENT:   Head: Normocephalic and atraumatic.   Neck: Neck supple. No spinous process tenderness and no muscular tenderness present. Normal range of motion present. No neck rigidity.   Cardiovascular: Normal rate, regular rhythm, S1 normal, S2 normal and normal heart sounds. Exam reveals no gallop.    No murmur heard.  Pulses:       Radial pulses are 2+ on the right side and 2+ on the left side.        Dorsalis pedis pulses are 2+ on the right side and 2+ on the left side.   Pulmonary/Chest: Effort normal and breath sounds normal. No tachypnea. She has no decreased breath sounds. She has no wheezes.   Abdominal: Soft. There is no abdominal tenderness.   Musculoskeletal:      Lumbar back: Normal.      Left upper leg: She exhibits tenderness. She exhibits no bony tenderness, no swelling and no edema.        Legs:       Comments: Lower extremities warm to touch, +2 DP pulses, no swelling, bruising noted.     Neurological: She is alert and oriented to person, place, and time. She has normal strength. She displays no atrophy. No sensory deficit. She exhibits normal muscle tone. GCS eye subscore is 4. GCS verbal subscore is 5. GCS motor subscore is 6.   Skin: Skin is warm, dry and intact.   Psychiatric: She has a normal mood and affect. Her behavior is normal.         ED Course   Procedures  Labs Reviewed   CBC W/ AUTO DIFFERENTIAL - Abnormal; Notable for the following components:       Result Value    Hemoglobin 9.9 (*)     " Hematocrit 29.4 (*)     MCV 64 (*)     MCH 21.7 (*)     RDW 18.6 (*)     Platelets 492 (*)     nRBC 2 (*)     All other components within normal limits   COMPREHENSIVE METABOLIC PANEL - Abnormal; Notable for the following components:    Glucose 114 (*)     Anion Gap 6 (*)     All other components within normal limits   RETICULOCYTES - Abnormal; Notable for the following components:    Retic 3.6 (*)     All other components within normal limits   URINALYSIS, REFLEX TO URINE CULTURE    Narrative:     Specimen Source->Urine   HIV 1 / 2 ANTIBODY   HEPATITIS C ANTIBODY   POCT URINE PREGNANCY   SARS-COV-2 RDRP GENE          Imaging Results    None          Medical Decision Making:   Differential Diagnosis:   Sciatica, muscle strain/sprain, sickle cell crisis  Clinical Tests:   Lab Tests: Ordered and Reviewed  ED Management:  35-year-old female with history of sickle cell anemia presented with left leg pain x2 days which was unrelieved by her pain medication at home, reported feels like her previous crisis to the same area.  On exam the extremity was warm to touch with +2 pulses., no erythema or bruising was present.  Patient was given fluids and morphine in the emergency department reportedly felt much better after administration.  Reticulocyte count was 3.6 which is lower than her usual, H and H on trend with her previous results.  Will discharge patient home with follow-up with her primary care.                     ED Course as of Nov 27 1237   Fri Nov 27, 2020   1213 Retic(!): 3.6 [AS]   1213 Hemoglobin(!): 9.9 [AS]   1213 Hematocrit(!): 29.4 [AS]      ED Course User Index  [AS] RAF Ervin            Clinical Impression:     ICD-10-CM ICD-9-CM   1. Sickle cell anemia with crisis  D57.00 282.62                      Disposition:   Disposition: Discharged  Condition: Stable     ED Disposition Condition    Discharge Stable        ED Prescriptions     None        Follow-up Information     Follow up With Specialties  Details Why Contact Info    Jagdish Whitley MD Family Medicine Schedule an appointment as soon as possible for a visit in 3 days  [address] [facility]  [facility] [address]  [phone]                                         Leticia Davenport, RAF  11/27/20 1235

## 2020-11-27 NOTE — TELEPHONE ENCOUNTER
Call returned to patient. No answer at this time. VML and patient portal message sent to patient recommending patient be evaluated and treated in ED for her SSC.     ----- Message from Opal Lindsey sent at 11/27/2020  9:05 AM CST -----  Contact: RICKY MEEKS [[id number]]  Type: Patient Call Back    Who called:RICKY MEEKS [[id number]]    What is the request in detail: Patient states that she is having a sickle cell crisis and she would like to be seen today.     Can the clinic reply by [facility]? no    Would the patient rather a call back or a response via [facility]? Call back    Best call back number: [phone] (mobile)    Additional Information:

## 2021-04-26 ENCOUNTER — PATIENT MESSAGE (OUTPATIENT)
Dept: RESEARCH | Facility: HOSPITAL | Age: 36
End: 2021-04-26

## 2022-03-03 ENCOUNTER — HOSPITAL ENCOUNTER (EMERGENCY)
Facility: HOSPITAL | Age: 37
Discharge: HOME | End: 2022-03-03
Attending: EMERGENCY MEDICINE
Payer: COMMERCIAL

## 2022-03-03 VITALS
HEIGHT: 62 IN | OXYGEN SATURATION: 98 % | RESPIRATION RATE: 20 BRPM | WEIGHT: 130 LBS | DIASTOLIC BLOOD PRESSURE: 80 MMHG | SYSTOLIC BLOOD PRESSURE: 146 MMHG | TEMPERATURE: 98 F | HEART RATE: 66 BPM | BODY MASS INDEX: 23.92 KG/M2

## 2022-03-03 DIAGNOSIS — R07.9 CHEST PAIN: ICD-10-CM

## 2022-03-03 DIAGNOSIS — R07.89 ATYPICAL CHEST PAIN: Primary | ICD-10-CM

## 2022-03-03 LAB
ALBUMIN SERPL BCP-MCNC: 3.9 G/DL (ref 3.5–5.2)
ALP SERPL-CCNC: 70 U/L (ref 55–135)
ALT SERPL W/O P-5'-P-CCNC: 14 U/L (ref 10–44)
ANION GAP SERPL CALC-SCNC: 12 MMOL/L (ref 8–16)
AST SERPL-CCNC: 20 U/L (ref 10–40)
BASOPHILS # BLD AUTO: 0.09 K/UL (ref 0–0.2)
BASOPHILS NFR BLD: 1 % (ref 0–1.9)
BILIRUB SERPL-MCNC: 1.1 MG/DL (ref 0.1–1)
BNP SERPL-MCNC: 21 PG/ML (ref 0–99)
BUN SERPL-MCNC: 8 MG/DL (ref 6–20)
CALCIUM SERPL-MCNC: 9.2 MG/DL (ref 8.7–10.5)
CHLORIDE SERPL-SCNC: 108 MMOL/L (ref 95–110)
CO2 SERPL-SCNC: 21 MMOL/L (ref 23–29)
CREAT SERPL-MCNC: 0.9 MG/DL (ref 0.5–1.4)
D DIMER PPP IA.FEU-MCNC: 1.34 MG/L FEU
DIFFERENTIAL METHOD: ABNORMAL
EOSINOPHIL # BLD AUTO: 0.2 K/UL (ref 0–0.5)
EOSINOPHIL NFR BLD: 1.7 % (ref 0–8)
ERYTHROCYTE [DISTWIDTH] IN BLOOD BY AUTOMATED COUNT: 18.6 % (ref 11.5–14.5)
EST. GFR  (AFRICAN AMERICAN): >60 ML/MIN/1.73 M^2
EST. GFR  (NON AFRICAN AMERICAN): >60 ML/MIN/1.73 M^2
GLUCOSE SERPL-MCNC: 95 MG/DL (ref 70–110)
HCT VFR BLD AUTO: 30 % (ref 37–48.5)
HGB BLD-MCNC: 10 G/DL (ref 12–16)
IMM GRANULOCYTES # BLD AUTO: 0.03 K/UL (ref 0–0.04)
IMM GRANULOCYTES NFR BLD AUTO: 0.3 % (ref 0–0.5)
LYMPHOCYTES # BLD AUTO: 2.3 K/UL (ref 1–4.8)
LYMPHOCYTES NFR BLD: 26.8 % (ref 18–48)
MCH RBC QN AUTO: 21.4 PG (ref 27–31)
MCHC RBC AUTO-ENTMCNC: 33.3 G/DL (ref 32–36)
MCV RBC AUTO: 64 FL (ref 82–98)
MONOCYTES # BLD AUTO: 0.7 K/UL (ref 0.3–1)
MONOCYTES NFR BLD: 8.5 % (ref 4–15)
NEUTROPHILS # BLD AUTO: 5.4 K/UL (ref 1.8–7.7)
NEUTROPHILS NFR BLD: 61.7 % (ref 38–73)
NRBC BLD-RTO: 2 /100 WBC
PLATELET # BLD AUTO: 550 K/UL (ref 150–450)
PMV BLD AUTO: 9.5 FL (ref 9.2–12.9)
POTASSIUM SERPL-SCNC: 4.1 MMOL/L (ref 3.5–5.1)
PROT SERPL-MCNC: 7.3 G/DL (ref 6–8.4)
RBC # BLD AUTO: 4.68 M/UL (ref 4–5.4)
SODIUM SERPL-SCNC: 141 MMOL/L (ref 136–145)
TROPONIN I SERPL DL<=0.01 NG/ML-MCNC: 0.01 NG/ML (ref 0–0.03)
TROPONIN I SERPL DL<=0.01 NG/ML-MCNC: <0.006 NG/ML (ref 0–0.03)
WBC # BLD AUTO: 8.74 K/UL (ref 3.9–12.7)

## 2022-03-03 PROCEDURE — 93010 ELECTROCARDIOGRAM REPORT: CPT | Mod: ,,, | Performed by: GENERAL PRACTICE

## 2022-03-03 PROCEDURE — 84484 ASSAY OF TROPONIN QUANT: CPT | Performed by: EMERGENCY MEDICINE

## 2022-03-03 PROCEDURE — 99285 EMERGENCY DEPT VISIT HI MDM: CPT | Mod: 25

## 2022-03-03 PROCEDURE — 93010 EKG 12-LEAD: ICD-10-PCS | Mod: ,,, | Performed by: GENERAL PRACTICE

## 2022-03-03 PROCEDURE — 85379 FIBRIN DEGRADATION QUANT: CPT | Performed by: EMERGENCY MEDICINE

## 2022-03-03 PROCEDURE — 85025 COMPLETE CBC W/AUTO DIFF WBC: CPT | Performed by: EMERGENCY MEDICINE

## 2022-03-03 PROCEDURE — 86803 HEPATITIS C AB TEST: CPT | Performed by: EMERGENCY MEDICINE

## 2022-03-03 PROCEDURE — 87389 HIV-1 AG W/HIV-1&-2 AB AG IA: CPT | Performed by: EMERGENCY MEDICINE

## 2022-03-03 PROCEDURE — 25500020 PHARM REV CODE 255: Performed by: EMERGENCY MEDICINE

## 2022-03-03 PROCEDURE — 93005 ELECTROCARDIOGRAM TRACING: CPT

## 2022-03-03 PROCEDURE — 36415 COLL VENOUS BLD VENIPUNCTURE: CPT | Performed by: EMERGENCY MEDICINE

## 2022-03-03 PROCEDURE — 80053 COMPREHEN METABOLIC PANEL: CPT | Performed by: EMERGENCY MEDICINE

## 2022-03-03 PROCEDURE — 83880 ASSAY OF NATRIURETIC PEPTIDE: CPT | Performed by: EMERGENCY MEDICINE

## 2022-03-03 RX ADMIN — IOHEXOL 75 ML: 350 INJECTION, SOLUTION INTRAVENOUS at 09:03

## 2022-03-03 NOTE — Clinical Note
"Shi Saba (Shantell) was seen and treated in our emergency department on 3/3/2022.  She may return to work on 03/04/2022.       If you have any questions or concerns, please don't hesitate to call.       RN    "

## 2022-03-03 NOTE — ED PROVIDER NOTES
"Encounter Date: 3/3/2022    SCRIBE #1 NOTE: I, Geovanna Cabrera, am scribing for, and in the presence of, Jimy Jacobo MD.       History     Chief Complaint   Patient presents with    Chest Pain     Chest pain for about x 1 week. Pain got worst this morning while dressing. Pt denies heavy lifting or strenuous activity.    Headache    Back Pain    Leg Pain    Eye Pain     Time seen by provider: 7:39 AM on 2022    Shi Saba is a 37 y.o. female who presents to the ED with an onset of intermittent CP that began 1 week ago and is worsening today.  CP is located to the center chest, is rated an 8/10 and is described as a "sharp pressure."   Pain is exacerbated with exertion.  She reports stopping her morphine for pain management 2 days ago and notes associated nausea.  Patient expresses sweating (last night), a recent nosebleed (now resolved), voice loss, eye pain, nonproductive cough, back pain, right hip pain, and HA.  Current Sx are similar to previous Sickle cell crisis, but not as intense.  She states a recent Hx of PNA 1-2 weeks ago but notes no recent Abx.  The patient denies fever, leg swelling, vomiting, painful urination, calf pain or any other symptoms at this time.  Patient has a PMHx of Sickle cell beta thalassemia, thrombocytosis, avascular necrosis of the left hip, encounter for blood transfusion, and environmental allergies.  No smoking or drug Hx.  No DM or HTN noted.  No FHx of MI.      The history is provided by the patient.     Review of patient's allergies indicates:   Allergen Reactions    Vicodin [hydrocodone-acetaminophen] Itching    Hydrocodone Rash     Past Medical History:   Diagnosis Date    Avascular necrosis of hip left    Encounter for blood transfusion     Environmental allergies      Past Surgical History:   Procedure Laterality Date     SECTION       History reviewed. No pertinent family history.  Social History     Tobacco Use    Smoking " status: Never Smoker    Smokeless tobacco: Never Used   Substance Use Topics    Alcohol use: No    Drug use: No     Review of Systems   Constitutional: Positive for diaphoresis. Negative for fever.   HENT: Positive for nosebleeds and voice change (loss). Negative for sore throat.    Eyes: Positive for pain.   Respiratory: Positive for cough. Negative for shortness of breath.    Cardiovascular: Positive for chest pain. Negative for leg swelling.   Gastrointestinal: Positive for nausea. Negative for vomiting.   Genitourinary: Negative for dysuria.   Musculoskeletal: Positive for arthralgias and back pain. Negative for myalgias.   Skin: Negative for rash.   Neurological: Positive for headaches. Negative for weakness.   Hematological: Does not bruise/bleed easily.       Physical Exam     Initial Vitals [03/03/22 0704]   BP Pulse Resp Temp SpO2   125/80 68 18 98.4 °F (36.9 °C) 100 %      MAP       --         Physical Exam    Nursing note and vitals reviewed.  Constitutional: She appears well-developed and well-nourished.  Non-toxic appearance. No distress.   HENT:   Head: Normocephalic and atraumatic.   Eyes: EOM are normal. Pupils are equal, round, and reactive to light.   Neck: Neck supple. No JVD present.   Normal range of motion.  Cardiovascular: Normal rate, regular rhythm, normal heart sounds and intact distal pulses. Exam reveals no gallop and no friction rub.    No murmur heard.  Pulmonary/Chest: Breath sounds normal. She has no wheezes. She has no rhonchi. She has no rales.   Abdominal: Abdomen is soft. Bowel sounds are normal. There is no abdominal tenderness. There is no rebound and no guarding.   Musculoskeletal:         General: Normal range of motion.      Cervical back: Normal range of motion and neck supple. No rigidity.      Right hip: Normal range of motion.      Left hip: Normal range of motion.      Right lower leg: No edema.      Left lower leg: No edema.      Comments: Negative Homans's sign.      Neurological: She is alert and oriented to person, place, and time. She has normal strength and normal reflexes. No cranial nerve deficit or sensory deficit. She exhibits normal muscle tone. Coordination normal. GCS eye subscore is 4. GCS verbal subscore is 5. GCS motor subscore is 6.   Skin: Skin is warm and dry.   Psychiatric: She has a normal mood and affect. Her speech is normal and behavior is normal. She is not actively hallucinating.         ED Course   Procedures  Labs Reviewed   CBC W/ AUTO DIFFERENTIAL - Abnormal; Notable for the following components:       Result Value    Hemoglobin 10.0 (*)     Hematocrit 30.0 (*)     MCV 64 (*)     MCH 21.4 (*)     RDW 18.6 (*)     Platelets 550 (*)     nRBC 2 (*)     All other components within normal limits   COMPREHENSIVE METABOLIC PANEL - Abnormal; Notable for the following components:    CO2 21 (*)     Total Bilirubin 1.1 (*)     All other components within normal limits   D DIMER, QUANTITATIVE - Abnormal; Notable for the following components:    D-Dimer 1.34 (*)     All other components within normal limits   TROPONIN I   B-TYPE NATRIURETIC PEPTIDE   TROPONIN I    Narrative:     Collection has been rescheduled by SLO at 03/03/2022 09:51 Reason:   Patient unavailable she went to ct    HIV 1 / 2 ANTIBODY   HEPATITIS C ANTIBODY     EKG Readings: (Independently Interpreted)   Normal sinus rhythm with sinus arrhythmia at a rate of 66 bpm with possible LVH, inferior and anterolateral ischemia, T wave inversion, and normal ST segments.       ECG Results          EKG 12-lead (In process)  Result time 03/03/22 08:16:58    In process by Interface, Lab In [facility] (03/03/22 08:16:58)                 Narrative:    Test Reason : R07.9,    Vent. Rate : 066 BPM     Atrial Rate : 066 BPM     P-R Int : 142 ms          QRS Dur : 084 ms      QT Int : 378 ms       P-R-T Axes : 046 020 -24 degrees     QTc Int : 396 ms    Normal sinus rhythm with sinus arrhythmia  Minimal voltage  criteria for LVH, may be normal variant  T wave abnormality, consider inferior ischemia  T wave abnormality, consider anterolateral ischemia  Abnormal ECG  When compared with ECG of 26-OCT-2018 11:57,  T wave inversion more evident in Inferior leads  T wave inversion now evident in Lateral leads    Referred By: [email or web address]   SELF           Confirmed By:                             Imaging Results          CTA Chest Non-Coronary(PE Studies) (Final result)  Result time 03/03/22 10:12:47    Final result by Marlon Mclean Jr., MD (03/03/22 10:12:47)                 Impression:      No CTA evidence of pulmonary embolus.  Two 4 mm nodules of the right lung as described.  Per Fleischner Society guidelines follow-up CT in 12 months is optional.  The rest of the CT of the chest is negative.      Electronically signed by: Marlon Mclean MD  Date:    03/03/2022  Time:    10:12             Narrative:    EXAMINATION:  CTA CHEST NON CORONARY    CLINICAL HISTORY:  Pulmonary embolism (PE) suspected, positive D-dimer;    TECHNIQUE:  Low dose axial images, sagittal and coronal reformations were obtained from the thoracic inlet to the lung bases following the IV administration of 75 mL of Omnipaque 350.  Contrast timing was optimized to evaluate the pulmonary arteries.  MIP images were performed.    COMPARISON:  The chest x-ray of March 3, 2022    FINDINGS:  There is no CTA evidence of pulmonary embolus.  Opacification of the pulmonary arteries is excellent.  Aortic dissection or aneurysm is not seen.  The heart and great vessels are of normal size and contour.  Adenopathy or soft tissue masses in the mediastinum are not seen.    On lung windows there is a 4 mm soft tissue density nodule in the right lower lobe adjacent to the major fissure on series 3, image 217.  There is a 4 mm pleural based soft tissue density nodule in the right upper lobe on series 3, image 256.  No larger intrapulmonary mass is seen.  No infiltrate or  atelectasis is noted.  The interstitial density is within normal limits.  No pneumothorax or pleural effusion is identified.                               X-Ray Chest AP Portable (Final result)  Result time 03/03/22 07:50:50    Final result by Marlon Mclean Jr., MD (03/03/22 07:50:50)                 Impression:      No acute abnormality.      Electronically signed by: Marlon Mclean MD  Date:    03/03/2022  Time:    07:50             Narrative:    EXAMINATION:  XR CHEST AP PORTABLE    CLINICAL HISTORY:  Chest pain, unspecified    TECHNIQUE:  Single frontal view of the chest was performed.    COMPARISON:  Chest x-ray of October 26, 2018    FINDINGS:  The lungs are clear, with normal appearance of pulmonary vasculature and no pleural effusion or pneumothorax.    The cardiac silhouette is normal in size. The hilar and mediastinal contours are unremarkable.    Bones are intact.                                 Medications   iohexoL (OMNIPAQUE 350) injection 75 mL (75 mLs Intravenous Given 3/3/22 0954)     Medical Decision Making:   History:   Old Medical Records: I decided to obtain old medical records.  Initial Assessment:   Patient is a 37-year-old woman who presents emergency department complaining of chest tightness that began 1 week ago described as a sharp pressure and was worsened today.  She also has been having some back pain and leg pain consistent with previous sickle cell crisis but not as severe.  She is afebrile well-appearing with no respiratory distress and normal O2 sats.  Cardiac workup is negative.  She has no fever, leukocytosis or infiltrates to suspect acute chest syndrome.  She does not have.  Be having a significant sickle cell crisis.  D-dimer is positive with low risk via Wells criteria and a negative CTA chest excludes pulmonary embolism.  Patient informed of pulmonary nodules and need for follow-up CT in 1 year.  Patient is discharged improved in no acute distress.  PCP follow-up  suggested.  Return precautions discussed.  Independently Interpreted Test(s):   I have ordered and independently interpreted X-rays - see prior notes.  I have ordered and independently interpreted EKG Reading(s) - see prior notes  Clinical Tests:   Lab Tests: Ordered and Reviewed  Radiological Study: Ordered and Reviewed  Medical Tests: Ordered and Reviewed          Scribe Attestation:   Scribe #1: I performed the above scribed service and the documentation accurately describes the services I performed. I attest to the accuracy of the note.               I, Donnell Dempsey, personally performed the services described in this documentation. All medical record entries made by the scribe were at my direction and in my presence.  I have reviewed the chart and agree that the record reflects my personal performance and is accurate and complete. Jimy Jacobo MD.    Clinical Impression:   Final diagnoses:  [R07.9] Chest pain  [R07.89] Atypical chest pain (Primary)          ED Disposition Condition    Discharge Stable        ED Prescriptions     None        Follow-up Information     Follow up With Specialties Details Why Contact Info    Jagdish Whitley MD Family Medicine Schedule an appointment as soon as possible for a visit in 1 week  [address] [place] [address] Dr Veronica B  [facility] [id number]  [phone]      [facility] Emergency Medicine  As needed, If symptoms worsen [address] [place] [id number] [phone]           Jimy Jacobo MD  03/03/22 [id number]

## 2022-03-04 LAB
HCV AB SERPL QL IA: NEGATIVE
HIV 1+2 AB+HIV1 P24 AG SERPL QL IA: NEGATIVE

## 2023-03-19 ENCOUNTER — HOSPITAL ENCOUNTER (EMERGENCY)
Facility: HOSPITAL | Age: 38
Discharge: HOME | End: 2023-03-19
Attending: EMERGENCY MEDICINE
Payer: MEDICAID

## 2023-03-19 VITALS
TEMPERATURE: 98 F | SYSTOLIC BLOOD PRESSURE: 122 MMHG | RESPIRATION RATE: 18 BRPM | DIASTOLIC BLOOD PRESSURE: 84 MMHG | HEART RATE: 66 BPM | OXYGEN SATURATION: 98 % | WEIGHT: 147 LBS | BODY MASS INDEX: 27.05 KG/M2 | HEIGHT: 62 IN

## 2023-03-19 DIAGNOSIS — R10.11 RIGHT UPPER QUADRANT ABDOMINAL PAIN: Primary | ICD-10-CM

## 2023-03-19 LAB
ALBUMIN SERPL BCP-MCNC: 4.1 G/DL (ref 3.5–5.2)
ALP SERPL-CCNC: 90 U/L (ref 55–135)
ALT SERPL W/O P-5'-P-CCNC: 40 U/L (ref 10–44)
ANION GAP SERPL CALC-SCNC: 9 MMOL/L (ref 8–16)
AST SERPL-CCNC: 44 U/L (ref 10–40)
B-HCG UR QL: NEGATIVE
BASOPHILS # BLD AUTO: 0.11 K/UL (ref 0–0.2)
BASOPHILS NFR BLD: 0.6 % (ref 0–1.9)
BILIRUB SERPL-MCNC: 1.5 MG/DL (ref 0.1–1)
BILIRUB UR QL STRIP: NEGATIVE
BUN SERPL-MCNC: 11 MG/DL (ref 6–20)
CALCIUM SERPL-MCNC: 9.3 MG/DL (ref 8.7–10.5)
CHLORIDE SERPL-SCNC: 107 MMOL/L (ref 95–110)
CLARITY UR: CLEAR
CO2 SERPL-SCNC: 24 MMOL/L (ref 23–29)
COLOR UR: YELLOW
CREAT SERPL-MCNC: 0.9 MG/DL (ref 0.5–1.4)
DIFFERENTIAL METHOD: ABNORMAL
EOSINOPHIL # BLD AUTO: 0.2 K/UL (ref 0–0.5)
EOSINOPHIL NFR BLD: 1 % (ref 0–8)
ERYTHROCYTE [DISTWIDTH] IN BLOOD BY AUTOMATED COUNT: 20 % (ref 11.5–14.5)
EST. GFR  (NO RACE VARIABLE): >60 ML/MIN/1.73 M^2
GLUCOSE SERPL-MCNC: 107 MG/DL (ref 70–110)
GLUCOSE UR QL STRIP: NEGATIVE
HCT VFR BLD AUTO: 28.7 % (ref 37–48.5)
HGB BLD-MCNC: 9.7 G/DL (ref 12–16)
HGB UR QL STRIP: NEGATIVE
IMM GRANULOCYTES # BLD AUTO: 0.04 K/UL (ref 0–0.04)
IMM GRANULOCYTES NFR BLD AUTO: 0.2 % (ref 0–0.5)
KETONES UR QL STRIP: NEGATIVE
LEUKOCYTE ESTERASE UR QL STRIP: NEGATIVE
LYMPHOCYTES # BLD AUTO: 3.9 K/UL (ref 1–4.8)
LYMPHOCYTES NFR BLD: 23 % (ref 18–48)
MCH RBC QN AUTO: 23.3 PG (ref 27–31)
MCHC RBC AUTO-ENTMCNC: 33.8 G/DL (ref 32–36)
MCV RBC AUTO: 69 FL (ref 82–98)
MONOCYTES # BLD AUTO: 1.3 K/UL (ref 0.3–1)
MONOCYTES NFR BLD: 7.6 % (ref 4–15)
NEUTROPHILS # BLD AUTO: 11.6 K/UL (ref 1.8–7.7)
NEUTROPHILS NFR BLD: 67.6 % (ref 38–73)
NITRITE UR QL STRIP: NEGATIVE
NRBC BLD-RTO: 3 /100 WBC
PH UR STRIP: 8 [PH] (ref 5–8)
PLATELET # BLD AUTO: 545 K/UL (ref 150–450)
PMV BLD AUTO: 9.3 FL (ref 9.2–12.9)
POTASSIUM SERPL-SCNC: 3.3 MMOL/L (ref 3.5–5.1)
PROT SERPL-MCNC: 7.5 G/DL (ref 6–8.4)
PROT UR QL STRIP: NEGATIVE
RBC # BLD AUTO: 4.16 M/UL (ref 4–5.4)
RETICS/RBC NFR AUTO: 6.4 % (ref 0.5–2.5)
SODIUM SERPL-SCNC: 140 MMOL/L (ref 136–145)
SP GR UR STRIP: 1 (ref 1–1.03)
URN SPEC COLLECT METH UR: NORMAL
UROBILINOGEN UR STRIP-ACNC: NEGATIVE EU/DL
WBC # BLD AUTO: 17.08 K/UL (ref 3.9–12.7)

## 2023-03-19 PROCEDURE — 99285 EMERGENCY DEPT VISIT HI MDM: CPT | Mod: 25

## 2023-03-19 PROCEDURE — 36415 COLL VENOUS BLD VENIPUNCTURE: CPT | Performed by: EMERGENCY MEDICINE

## 2023-03-19 PROCEDURE — 81003 URINALYSIS AUTO W/O SCOPE: CPT | Performed by: EMERGENCY MEDICINE

## 2023-03-19 PROCEDURE — 85025 COMPLETE CBC W/AUTO DIFF WBC: CPT | Performed by: EMERGENCY MEDICINE

## 2023-03-19 PROCEDURE — 96374 THER/PROPH/DIAG INJ IV PUSH: CPT

## 2023-03-19 PROCEDURE — 80053 COMPREHEN METABOLIC PANEL: CPT | Performed by: EMERGENCY MEDICINE

## 2023-03-19 PROCEDURE — 85045 AUTOMATED RETICULOCYTE COUNT: CPT | Performed by: EMERGENCY MEDICINE

## 2023-03-19 PROCEDURE — 63600175 PHARM REV CODE 636 W HCPCS: Performed by: EMERGENCY MEDICINE

## 2023-03-19 PROCEDURE — 81025 URINE PREGNANCY TEST: CPT | Performed by: EMERGENCY MEDICINE

## 2023-03-19 PROCEDURE — 96375 TX/PRO/DX INJ NEW DRUG ADDON: CPT

## 2023-03-19 RX ORDER — MORPHINE SULFATE 10 MG/ML
10 INJECTION INTRAMUSCULAR; INTRAVENOUS; SUBCUTANEOUS
Status: COMPLETED | OUTPATIENT
Start: 2023-03-19 | End: 2023-03-19

## 2023-03-19 RX ORDER — PANTOPRAZOLE SODIUM 40 MG/1
40 TABLET, DELAYED RELEASE ORAL DAILY
Qty: 30 TABLET | Refills: 3 | Status: SHIPPED | OUTPATIENT
Start: 2023-03-19 | End: 2024-03-18

## 2023-03-19 RX ORDER — ONDANSETRON 2 MG/ML
4 INJECTION INTRAMUSCULAR; INTRAVENOUS
Status: COMPLETED | OUTPATIENT
Start: 2023-03-19 | End: 2023-03-19

## 2023-03-19 RX ORDER — ONDANSETRON HYDROCHLORIDE 8 MG/1
8 TABLET, FILM COATED ORAL EVERY 12 HOURS PRN
Qty: 8 TABLET | Refills: 1 | Status: SHIPPED | OUTPATIENT
Start: 2023-03-19 | End: 2023-11-11 | Stop reason: CLARIF

## 2023-03-19 RX ADMIN — ONDANSETRON 4 MG: 2 INJECTION INTRAMUSCULAR; INTRAVENOUS at 06:03

## 2023-03-19 RX ADMIN — MORPHINE SULFATE 10 MG: 10 INJECTION, SOLUTION INTRAMUSCULAR; INTRAVENOUS at 06:03

## 2023-03-19 NOTE — ED PROVIDER NOTES
Encounter Date: 3/19/2023       History     Chief Complaint   Patient presents with    Sickle Cell Pain Crisis     Started today and having pain to side and back     HPI patient is a 38-year-old woman who presents emergency department for evaluation pain in her right upper quadrant that radiates to her back and began this morning upon waking up.  She had associated nausea with vomiting today.  She denies any fever.  She did not take any medication despite having morphine at home for the pain.  Review of patient's allergies indicates:   Allergen Reactions    Vicodin [hydrocodone-acetaminophen] Itching    Hydrocodone Rash     Past Medical History:   Diagnosis Date    Avascular necrosis of hip left    Encounter for blood transfusion     Environmental allergies      Past Surgical History:   Procedure Laterality Date     SECTION       History reviewed. No pertinent family history.  Social History     Tobacco Use    Smoking status: Never    Smokeless tobacco: Never   Substance Use Topics    Alcohol use: No    Drug use: No     Review of Systems   Constitutional:  Negative for fever.   HENT:  Negative for sore throat.    Respiratory:  Negative for shortness of breath.    Cardiovascular:  Negative for chest pain.   Gastrointestinal:  Positive for abdominal pain and vomiting. Negative for nausea.   Genitourinary:  Negative for dysuria.   Musculoskeletal:  Positive for back pain.   Skin:  Negative for rash.   Neurological:  Negative for weakness.   Hematological:  Does not bruise/bleed easily.     Physical Exam     Initial Vitals   BP Pulse Resp Temp SpO2   23 0533 23 0502 23 0502 23 0502 23 0502   130/78 80 18 97.9 °F (36.6 °C) 99 %      MAP       --                Physical Exam    Constitutional: Vital signs are normal. She appears well-developed and well-nourished.  Non-toxic appearance. No distress.   HENT:   Head: Normocephalic and atraumatic.   Eyes: EOM are normal. Pupils are equal,  round, and reactive to light.   Neck: Neck supple. No JVD present.   Normal range of motion.  Cardiovascular:  Normal rate, regular rhythm, normal heart sounds and intact distal pulses.     Exam reveals no gallop and no friction rub.       No murmur heard.  Pulmonary/Chest: Breath sounds normal. She has no wheezes. She has no rhonchi. She has no rales.   Abdominal: Abdomen is soft. Bowel sounds are normal. There is abdominal tenderness in the right upper quadrant. There is no rebound, no guarding and negative Christiansen's sign.   Musculoskeletal:         General: Normal range of motion.      Cervical back: Normal range of motion and neck supple. No rigidity.     Neurological: She is alert and oriented to person, place, and time. She has normal strength and normal reflexes. No cranial nerve deficit or sensory deficit. She exhibits normal muscle tone. Coordination normal. GCS eye subscore is 4. GCS verbal subscore is 5. GCS motor subscore is 6.   Skin: Skin is warm and dry.   Psychiatric: She has a normal mood and affect. Her speech is normal and behavior is normal. She is not actively hallucinating.       ED Course   Procedures  Labs Reviewed   CBC W/ AUTO DIFFERENTIAL - Abnormal; Notable for the following components:       Result Value    WBC 17.08 (*)     Hemoglobin 9.7 (*)     Hematocrit 28.7 (*)     MCV 69 (*)     MCH 23.3 (*)     RDW 20.0 (*)     Platelets 545 (*)     Gran # (ANC) 11.6 (*)     Mono # 1.3 (*)     nRBC 3 (*)     All other components within normal limits   COMPREHENSIVE METABOLIC PANEL - Abnormal; Notable for the following components:    Potassium 3.3 (*)     Total Bilirubin 1.5 (*)     AST 44 (*)     All other components within normal limits   RETICULOCYTES - Abnormal; Notable for the following components:    Retic 6.4 (*)     All other components within normal limits   PREGNANCY TEST, URINE RAPID    Narrative:     Specimen Source->Urine   URINALYSIS, REFLEX TO URINE CULTURE    Narrative:     Specimen  Source->Urine          Imaging Results              CT Abdomen Pelvis  Without Contrast (Final result)  Result time 03/19/23 08:13:15      Final result by Frandy Thrasher MD (03/19/23 08:13:15)                   Impression:      No evidence for urolithiasis or obstructive uropathy.    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    Mildly enlarged common bile duct, nonspecific.    Mild hepatic enlargement.      Electronically signed by: Frandy Thrasher MD  Date:    03/19/2023  Time:    08:13               Narrative:    EXAMINATION:  CT ABDOMEN PELVIS WITHOUT CONTRAST    CLINICAL HISTORY:  Flank pain, kidney stone suspected;    TECHNIQUE:  Low dose axial images, sagittal and coronal reformations were obtained from the lung bases to the pubic symphysis.  Oral contrast was not administered.    COMPARISON:  None    FINDINGS:  The liver is mildly enlarged.  The spleen is markedly small in size and diffusely calcified in keeping with auto infarction, measuring 3.4 cm in length.  The finding on ultrasound of the same date thought the represent the spleen was likely the tip of the left hepatic lobe extending in the left upper quadrant, partially obscured by bowel gas.    The gallbladder is moderately distended.  No calcified gallstones are present.  The common bile duct is mildly enlarged at 8 mm, with no visible calcified intraductal stone.  The pancreas is unremarkable the adrenal glands are normal.    There is no urolithiasis or hydroureteronephrosis.    There is a small fat containing umbilical hernia.  The bowel is nondilated.  The appendix is normal.  The colon is unremarkable.  There is a physiologic amount of pelvic free fluid.  There is no free air.  No fluid collection.    There are sclerotic changes of numerous lumbar vertebrae, sacrum, pelvis, and proximal femurs compatible with sequelae of chronic bone infarct.  There is chronic deformity of the  left femoral head, accompanied by multiple intra-articular bodies including a large 2.7 cm os.                                       US Abdomen Limited (Final result)  Result time 03/19/23 07:24:33      Final result by Frandy Thrasher MD (03/19/23 07:24:33)                   Impression:      Mild dilatation of the common bile duct.  Normal appearance of the gallbladder.      Electronically signed by: Frandy Thrasher MD  Date:    03/19/2023  Time:    07:24               Narrative:    EXAMINATION:  US ABDOMEN LIMITED    CLINICAL HISTORY:  r/o acute cholecystitis;    TECHNIQUE:  Limited ultrasound of the right upper quadrant of the abdomen (including pancreas, liver, gallbladder, common bile duct, and right kidney) was performed.    COMPARISON:  None.    FINDINGS:  The pancreas is obscured.  The gallbladder is within normal limits.  Sonographic Christiansen sign is negative.  The common bile duct is a mildly enlarged for patient age at 9 mm.  The IVC is normal caliber.  The liver is normal in size without focal lesion the spleen is normal size at 8 cm.                                       Medications   morphine injection 10 mg (10 mg Intravenous Given 3/19/23 0618)   ondansetron injection 4 mg (4 mg Intravenous Given 3/19/23 0618)     Medical Decision Making:   History:   Old Medical Records: I decided to obtain old medical records.  Initial Assessment:   38-year-old woman with history of sickle cell disease presents emergency department complaining of right upper quadrant abdominal pain after laying on a pillow.  This happened to her in the past while laying on several pillows.  On examination she does exhibit tenderness in the right upper quadrant however has a negative Christiansen sign.  Has leukocytosis 17,000 however is afebrile and has no left shift.  Her total bilirubin is mildly elevated at 1.5 potassium 3.3 and creatinine 0 9.  Hemoglobin is 9.7 hematocrit 28.7.  Reticulocyte count is 6.7.  I doubt aplastic  crisis.  Right upper quadrant ultrasound performed showed mild dilation of the common bile duct with normal appearance of the gallbladder.  CT abdomen pelvis was performed for further explanation of her pain.  Care turned over to Dr. Farrar at end of shift pending results from CT scan.           ED Course as of 03/19/23 2207   Sun Mar 19, 2023   0823 I assumed care of the patient at 7:00 a.m..  She presented with abrupt onset of predominantly right upper quadrant pain.  The etiology remains uncertain.  She has a mildly elevated bile duct with no evidence of biliary disease.  Bilirubin is minimally elevated at 1.5 with no substantial elevation of transaminases.  The etiology of her leukocytosis remains unclear.  She is currently without significant pain.  CT fails to demonstrate any significant acute pathology.  She is encouraged to return for worsening pain and will be treated empirically for possible peptic ulcer disease. [EH]      ED Course User Index  [EH] Zhao Farrar III, MD                 Clinical Impression:   Final diagnoses:  [R10.11] Right upper quadrant abdominal pain (Primary)        ED Disposition Condition    Discharge Stable          ED Prescriptions       Medication Sig Dispense Start Date End Date Auth. Provider    pantoprazole (PROTONIX) 40 MG tablet Take 1 tablet (40 mg total) by mouth once daily. 30 tablet 3/19/2023 3/18/2024 Zhao Farrar III, MD    ondansetron (ZOFRAN) 8 MG tablet Take 1 tablet (8 mg total) by mouth every 12 (twelve) hours as needed for Nausea. 8 tablet 3/19/2023 -- Zhao Farrar III, MD          Follow-up Information       Follow up With Specialties Details Why Contact Info    Jagdish Whitley MD Family Medicine In 2 days  [address] [place] [address]   [address]  [facility] [address]  [phone]               Jimy Jacobo MD  03/19/23 2207

## 2023-04-05 NOTE — TELEPHONE ENCOUNTER
Called Ms Saba, No answer. Message left on VM. Dr Harrison review her recent lab results and all were stable.    Topical Sulfur Applications Pregnancy And Lactation Text: This medication is Pregnancy Category C and has an unknown safety profile during pregnancy. It is unknown if this topical medication is excreted in breast milk.

## 2023-05-16 PROCEDURE — 85045 AUTOMATED RETICULOCYTE COUNT: CPT | Performed by: NURSE PRACTITIONER

## 2023-05-16 PROCEDURE — 86803 HEPATITIS C AB TEST: CPT | Performed by: EMERGENCY MEDICINE

## 2023-05-16 PROCEDURE — 96376 TX/PRO/DX INJ SAME DRUG ADON: CPT

## 2023-05-16 PROCEDURE — 87389 HIV-1 AG W/HIV-1&-2 AB AG IA: CPT | Performed by: EMERGENCY MEDICINE

## 2023-05-16 PROCEDURE — 96375 TX/PRO/DX INJ NEW DRUG ADDON: CPT

## 2023-05-16 PROCEDURE — 80053 COMPREHEN METABOLIC PANEL: CPT | Performed by: NURSE PRACTITIONER

## 2023-05-16 PROCEDURE — 99285 EMERGENCY DEPT VISIT HI MDM: CPT | Mod: 25

## 2023-05-16 PROCEDURE — 36415 COLL VENOUS BLD VENIPUNCTURE: CPT | Performed by: EMERGENCY MEDICINE

## 2023-05-16 PROCEDURE — 96361 HYDRATE IV INFUSION ADD-ON: CPT

## 2023-05-16 PROCEDURE — 85025 COMPLETE CBC W/AUTO DIFF WBC: CPT | Performed by: NURSE PRACTITIONER

## 2023-05-16 PROCEDURE — 83690 ASSAY OF LIPASE: CPT | Performed by: NURSE PRACTITIONER

## 2023-05-17 ENCOUNTER — HOSPITAL ENCOUNTER (OUTPATIENT)
Facility: HOSPITAL | Age: 38
Discharge: HOME | End: 2023-05-18
Attending: EMERGENCY MEDICINE | Admitting: STUDENT IN AN ORGANIZED HEALTH CARE EDUCATION/TRAINING PROGRAM
Payer: MEDICAID

## 2023-05-17 DIAGNOSIS — R07.9 CHEST PAIN: ICD-10-CM

## 2023-05-17 DIAGNOSIS — K81.0 CHOLECYSTITIS, ACUTE: Primary | ICD-10-CM

## 2023-05-17 DIAGNOSIS — K81.9 CHOLECYSTITIS: ICD-10-CM

## 2023-05-17 PROBLEM — D57.1 SICKLE CELL DISEASE: Status: ACTIVE | Noted: 2019-04-12

## 2023-05-17 LAB
ALBUMIN SERPL BCP-MCNC: 3.9 G/DL (ref 3.5–5.2)
ALBUMIN SERPL BCP-MCNC: 4.3 G/DL (ref 3.5–5.2)
ALP SERPL-CCNC: 88 U/L (ref 55–135)
ALP SERPL-CCNC: 95 U/L (ref 55–135)
ALT SERPL W/O P-5'-P-CCNC: 66 U/L (ref 10–44)
ALT SERPL W/O P-5'-P-CCNC: 83 U/L (ref 10–44)
ANION GAP SERPL CALC-SCNC: 11 MMOL/L (ref 8–16)
ANION GAP SERPL CALC-SCNC: 8 MMOL/L (ref 8–16)
AST SERPL-CCNC: 115 U/L (ref 10–40)
AST SERPL-CCNC: 59 U/L (ref 10–40)
B-HCG UR QL: NEGATIVE
BASOPHILS # BLD AUTO: 0.1 K/UL (ref 0–0.2)
BASOPHILS # BLD AUTO: 0.1 K/UL (ref 0–0.2)
BASOPHILS NFR BLD: 0.6 % (ref 0–1.9)
BASOPHILS NFR BLD: 0.7 % (ref 0–1.9)
BILIRUB SERPL-MCNC: 1.1 MG/DL (ref 0.1–1)
BILIRUB SERPL-MCNC: 1.3 MG/DL (ref 0.1–1)
BILIRUB UR QL STRIP: NEGATIVE
BUN SERPL-MCNC: 5 MG/DL (ref 6–20)
BUN SERPL-MCNC: 6 MG/DL (ref 6–20)
CALCIUM SERPL-MCNC: 9.5 MG/DL (ref 8.7–10.5)
CALCIUM SERPL-MCNC: 9.6 MG/DL (ref 8.7–10.5)
CHLORIDE SERPL-SCNC: 109 MMOL/L (ref 95–110)
CHLORIDE SERPL-SCNC: 111 MMOL/L (ref 95–110)
CLARITY UR: CLEAR
CO2 SERPL-SCNC: 21 MMOL/L (ref 23–29)
CO2 SERPL-SCNC: 24 MMOL/L (ref 23–29)
COLOR UR: YELLOW
CREAT SERPL-MCNC: 0.9 MG/DL (ref 0.5–1.4)
CREAT SERPL-MCNC: 0.9 MG/DL (ref 0.5–1.4)
DIFFERENTIAL METHOD: ABNORMAL
DIFFERENTIAL METHOD: ABNORMAL
EOSINOPHIL # BLD AUTO: 0 K/UL (ref 0–0.5)
EOSINOPHIL # BLD AUTO: 0.2 K/UL (ref 0–0.5)
EOSINOPHIL NFR BLD: 0.2 % (ref 0–8)
EOSINOPHIL NFR BLD: 1.3 % (ref 0–8)
ERYTHROCYTE [DISTWIDTH] IN BLOOD BY AUTOMATED COUNT: 17.5 % (ref 11.5–14.5)
ERYTHROCYTE [DISTWIDTH] IN BLOOD BY AUTOMATED COUNT: 17.8 % (ref 11.5–14.5)
EST. GFR  (NO RACE VARIABLE): >60 ML/MIN/1.73 M^2
EST. GFR  (NO RACE VARIABLE): >60 ML/MIN/1.73 M^2
GLUCOSE SERPL-MCNC: 106 MG/DL (ref 70–110)
GLUCOSE SERPL-MCNC: 110 MG/DL (ref 70–110)
GLUCOSE UR QL STRIP: NEGATIVE
HCT VFR BLD AUTO: 28.1 % (ref 37–48.5)
HCT VFR BLD AUTO: 28.7 % (ref 37–48.5)
HCV AB SERPL QL IA: NORMAL
HGB BLD-MCNC: 10 G/DL (ref 12–16)
HGB BLD-MCNC: 9.6 G/DL (ref 12–16)
HGB UR QL STRIP: NEGATIVE
HIV 1+2 AB+HIV1 P24 AG SERPL QL IA: NORMAL
IMM GRANULOCYTES # BLD AUTO: 0.02 K/UL (ref 0–0.04)
IMM GRANULOCYTES # BLD AUTO: 0.06 K/UL (ref 0–0.04)
IMM GRANULOCYTES NFR BLD AUTO: 0.1 % (ref 0–0.5)
IMM GRANULOCYTES NFR BLD AUTO: 0.4 % (ref 0–0.5)
KETONES UR QL STRIP: NEGATIVE
LEUKOCYTE ESTERASE UR QL STRIP: NEGATIVE
LIPASE SERPL-CCNC: 31 U/L (ref 4–60)
LYMPHOCYTES # BLD AUTO: 2.2 K/UL (ref 1–4.8)
LYMPHOCYTES # BLD AUTO: 3.6 K/UL (ref 1–4.8)
LYMPHOCYTES NFR BLD: 13.3 % (ref 18–48)
LYMPHOCYTES NFR BLD: 25.3 % (ref 18–48)
MCH RBC QN AUTO: 21.9 PG (ref 27–31)
MCH RBC QN AUTO: 22.5 PG (ref 27–31)
MCHC RBC AUTO-ENTMCNC: 34.2 G/DL (ref 32–36)
MCHC RBC AUTO-ENTMCNC: 34.8 G/DL (ref 32–36)
MCV RBC AUTO: 64 FL (ref 82–98)
MCV RBC AUTO: 65 FL (ref 82–98)
MONOCYTES # BLD AUTO: 0.9 K/UL (ref 0.3–1)
MONOCYTES # BLD AUTO: 1 K/UL (ref 0.3–1)
MONOCYTES NFR BLD: 5.9 % (ref 4–15)
MONOCYTES NFR BLD: 6.2 % (ref 4–15)
NEUTROPHILS # BLD AUTO: 13.2 K/UL (ref 1.8–7.7)
NEUTROPHILS # BLD AUTO: 9.6 K/UL (ref 1.8–7.7)
NEUTROPHILS NFR BLD: 66.7 % (ref 38–73)
NEUTROPHILS NFR BLD: 79.3 % (ref 38–73)
NITRITE UR QL STRIP: NEGATIVE
NRBC BLD-RTO: 1 /100 WBC
NRBC BLD-RTO: 2 /100 WBC
PH UR STRIP: 6 [PH] (ref 5–8)
PLATELET # BLD AUTO: 461 K/UL (ref 150–450)
PLATELET # BLD AUTO: 464 K/UL (ref 150–450)
PMV BLD AUTO: 8.9 FL (ref 9.2–12.9)
PMV BLD AUTO: 9.2 FL (ref 9.2–12.9)
POTASSIUM SERPL-SCNC: 4 MMOL/L (ref 3.5–5.1)
POTASSIUM SERPL-SCNC: 4.1 MMOL/L (ref 3.5–5.1)
PROT SERPL-MCNC: 6.9 G/DL (ref 6–8.4)
PROT SERPL-MCNC: 7.6 G/DL (ref 6–8.4)
PROT UR QL STRIP: NEGATIVE
RBC # BLD AUTO: 4.39 M/UL (ref 4–5.4)
RBC # BLD AUTO: 4.45 M/UL (ref 4–5.4)
RETICS/RBC NFR AUTO: 4.2 % (ref 0.5–2.5)
SODIUM SERPL-SCNC: 141 MMOL/L (ref 136–145)
SODIUM SERPL-SCNC: 143 MMOL/L (ref 136–145)
SP GR UR STRIP: 1.01 (ref 1–1.03)
URN SPEC COLLECT METH UR: NORMAL
UROBILINOGEN UR STRIP-ACNC: NEGATIVE EU/DL
WBC # BLD AUTO: 14.32 K/UL (ref 3.9–12.7)
WBC # BLD AUTO: 16.66 K/UL (ref 3.9–12.7)

## 2023-05-17 PROCEDURE — 63600175 PHARM REV CODE 636 W HCPCS: Performed by: NURSE PRACTITIONER

## 2023-05-17 PROCEDURE — 99223 1ST HOSP IP/OBS HIGH 75: CPT | Mod: 57,,, | Performed by: STUDENT IN AN ORGANIZED HEALTH CARE EDUCATION/TRAINING PROGRAM

## 2023-05-17 PROCEDURE — 96365 THER/PROPH/DIAG IV INF INIT: CPT

## 2023-05-17 PROCEDURE — 96375 TX/PRO/DX INJ NEW DRUG ADDON: CPT

## 2023-05-17 PROCEDURE — 80053 COMPREHEN METABOLIC PANEL: CPT | Performed by: NURSE PRACTITIONER

## 2023-05-17 PROCEDURE — 85025 COMPLETE CBC W/AUTO DIFF WBC: CPT | Performed by: NURSE PRACTITIONER

## 2023-05-17 PROCEDURE — 96376 TX/PRO/DX INJ SAME DRUG ADON: CPT

## 2023-05-17 PROCEDURE — G0378 HOSPITAL OBSERVATION PER HR: HCPCS

## 2023-05-17 PROCEDURE — 96361 HYDRATE IV INFUSION ADD-ON: CPT

## 2023-05-17 PROCEDURE — 25000003 PHARM REV CODE 250: Performed by: NURSE PRACTITIONER

## 2023-05-17 PROCEDURE — 63600175 PHARM REV CODE 636 W HCPCS: Performed by: EMERGENCY MEDICINE

## 2023-05-17 PROCEDURE — 99900035 HC TECH TIME PER 15 MIN (STAT)

## 2023-05-17 PROCEDURE — 96366 THER/PROPH/DIAG IV INF ADDON: CPT | Mod: 59

## 2023-05-17 PROCEDURE — 25500020 PHARM REV CODE 255

## 2023-05-17 PROCEDURE — 25000003 PHARM REV CODE 250: Performed by: EMERGENCY MEDICINE

## 2023-05-17 PROCEDURE — 94761 N-INVAS EAR/PLS OXIMETRY MLT: CPT

## 2023-05-17 PROCEDURE — 99223 PR INITIAL HOSPITAL CARE,LEVL III: ICD-10-PCS | Mod: 57,,, | Performed by: STUDENT IN AN ORGANIZED HEALTH CARE EDUCATION/TRAINING PROGRAM

## 2023-05-17 PROCEDURE — 36415 COLL VENOUS BLD VENIPUNCTURE: CPT | Performed by: NURSE PRACTITIONER

## 2023-05-17 PROCEDURE — 81025 URINE PREGNANCY TEST: CPT | Performed by: EMERGENCY MEDICINE

## 2023-05-17 PROCEDURE — 81003 URINALYSIS AUTO W/O SCOPE: CPT | Performed by: NURSE PRACTITIONER

## 2023-05-17 RX ORDER — NALOXONE HCL 0.4 MG/ML
0.02 VIAL (ML) INJECTION
Status: DISCONTINUED | OUTPATIENT
Start: 2023-05-17 | End: 2023-05-18 | Stop reason: HOSPADM

## 2023-05-17 RX ORDER — FERROUS GLUCONATE 324(38)MG
324 TABLET ORAL 2 TIMES DAILY WITH MEALS
Status: DISCONTINUED | OUTPATIENT
Start: 2023-05-17 | End: 2023-05-18 | Stop reason: HOSPADM

## 2023-05-17 RX ORDER — FOLIC ACID 1 MG/1
1 TABLET ORAL DAILY
Status: DISCONTINUED | OUTPATIENT
Start: 2023-05-17 | End: 2023-05-18 | Stop reason: HOSPADM

## 2023-05-17 RX ORDER — SIMETHICONE 80 MG
1 TABLET,CHEWABLE ORAL 4 TIMES DAILY PRN
Status: DISCONTINUED | OUTPATIENT
Start: 2023-05-17 | End: 2023-05-18 | Stop reason: HOSPADM

## 2023-05-17 RX ORDER — GLUCAGON 1 MG
1 KIT INJECTION
Status: DISCONTINUED | OUTPATIENT
Start: 2023-05-17 | End: 2023-05-18 | Stop reason: HOSPADM

## 2023-05-17 RX ORDER — ACETAMINOPHEN 325 MG/1
650 TABLET ORAL EVERY 8 HOURS PRN
Status: DISCONTINUED | OUTPATIENT
Start: 2023-05-17 | End: 2023-05-18 | Stop reason: HOSPADM

## 2023-05-17 RX ORDER — DEXTROSE 40 %
15 GEL (GRAM) ORAL
Status: DISCONTINUED | OUTPATIENT
Start: 2023-05-17 | End: 2023-05-18 | Stop reason: HOSPADM

## 2023-05-17 RX ORDER — MORPHINE SULFATE 4 MG/ML
8 INJECTION, SOLUTION INTRAMUSCULAR; INTRAVENOUS EVERY 4 HOURS PRN
Status: DISCONTINUED | OUTPATIENT
Start: 2023-05-17 | End: 2023-05-18

## 2023-05-17 RX ORDER — ONDANSETRON 2 MG/ML
4 INJECTION INTRAMUSCULAR; INTRAVENOUS EVERY 6 HOURS PRN
Status: DISCONTINUED | OUTPATIENT
Start: 2023-05-17 | End: 2023-05-18 | Stop reason: HOSPADM

## 2023-05-17 RX ORDER — SODIUM CHLORIDE 0.9 % (FLUSH) 0.9 %
10 SYRINGE (ML) INJECTION EVERY 8 HOURS PRN
Status: DISCONTINUED | OUTPATIENT
Start: 2023-05-17 | End: 2023-05-18 | Stop reason: HOSPADM

## 2023-05-17 RX ORDER — MORPHINE SULFATE 4 MG/ML
4 INJECTION, SOLUTION INTRAMUSCULAR; INTRAVENOUS EVERY 4 HOURS PRN
Status: DISCONTINUED | OUTPATIENT
Start: 2023-05-17 | End: 2023-05-18

## 2023-05-17 RX ORDER — TALC
6 POWDER (GRAM) TOPICAL NIGHTLY PRN
Status: DISCONTINUED | OUTPATIENT
Start: 2023-05-17 | End: 2023-05-18 | Stop reason: HOSPADM

## 2023-05-17 RX ORDER — DEXTROSE 40 %
30 GEL (GRAM) ORAL
Status: DISCONTINUED | OUTPATIENT
Start: 2023-05-17 | End: 2023-05-18 | Stop reason: HOSPADM

## 2023-05-17 RX ORDER — SODIUM CHLORIDE 9 MG/ML
INJECTION, SOLUTION INTRAVENOUS ONCE
Status: COMPLETED | OUTPATIENT
Start: 2023-05-17 | End: 2023-05-17

## 2023-05-17 RX ORDER — AMOXICILLIN 250 MG
1 CAPSULE ORAL 2 TIMES DAILY
Status: DISCONTINUED | OUTPATIENT
Start: 2023-05-17 | End: 2023-05-18 | Stop reason: HOSPADM

## 2023-05-17 RX ORDER — MORPHINE SULFATE 4 MG/ML
8 INJECTION, SOLUTION INTRAMUSCULAR; INTRAVENOUS
Status: COMPLETED | OUTPATIENT
Start: 2023-05-17 | End: 2023-05-17

## 2023-05-17 RX ORDER — ONDANSETRON 2 MG/ML
4 INJECTION INTRAMUSCULAR; INTRAVENOUS
Status: COMPLETED | OUTPATIENT
Start: 2023-05-17 | End: 2023-05-17

## 2023-05-17 RX ORDER — PROCHLORPERAZINE EDISYLATE 5 MG/ML
5 INJECTION INTRAMUSCULAR; INTRAVENOUS EVERY 6 HOURS PRN
Status: DISCONTINUED | OUTPATIENT
Start: 2023-05-17 | End: 2023-05-18 | Stop reason: HOSPADM

## 2023-05-17 RX ORDER — MAG HYDROX/ALUMINUM HYD/SIMETH 200-200-20
30 SUSPENSION, ORAL (FINAL DOSE FORM) ORAL 4 TIMES DAILY PRN
Status: DISCONTINUED | OUTPATIENT
Start: 2023-05-17 | End: 2023-05-18 | Stop reason: HOSPADM

## 2023-05-17 RX ADMIN — PIPERACILLIN AND TAZOBACTAM 4.5 G: 4; .5 INJECTION, POWDER, LYOPHILIZED, FOR SOLUTION INTRAVENOUS; PARENTERAL at 04:05

## 2023-05-17 RX ADMIN — MORPHINE SULFATE 8 MG: 4 INJECTION INTRAVENOUS at 02:05

## 2023-05-17 RX ADMIN — ACETAMINOPHEN 650 MG: 325 TABLET ORAL at 10:05

## 2023-05-17 RX ADMIN — ONDANSETRON 4 MG: 2 INJECTION INTRAMUSCULAR; INTRAVENOUS at 08:05

## 2023-05-17 RX ADMIN — Medication 324 MG: at 07:05

## 2023-05-17 RX ADMIN — MORPHINE SULFATE 8 MG: 4 INJECTION, SOLUTION INTRAMUSCULAR; INTRAVENOUS at 07:05

## 2023-05-17 RX ADMIN — SODIUM CHLORIDE: 9 INJECTION, SOLUTION INTRAVENOUS at 06:05

## 2023-05-17 RX ADMIN — ONDANSETRON 4 MG: 2 INJECTION INTRAMUSCULAR; INTRAVENOUS at 02:05

## 2023-05-17 RX ADMIN — SODIUM CHLORIDE 1000 ML: 9 INJECTION, SOLUTION INTRAVENOUS at 02:05

## 2023-05-17 RX ADMIN — PIPERACILLIN AND TAZOBACTAM 4.5 G: 4; .5 INJECTION, POWDER, LYOPHILIZED, FOR SOLUTION INTRAVENOUS; PARENTERAL at 06:05

## 2023-05-17 RX ADMIN — FOLIC ACID 1 MG: 1 TABLET ORAL at 09:05

## 2023-05-17 RX ADMIN — IOHEXOL 75 ML: 350 INJECTION, SOLUTION INTRAVENOUS at 02:05

## 2023-05-17 NOTE — SUBJECTIVE & OBJECTIVE
Past Medical History:   Diagnosis Date    Avascular necrosis of hip left    Encounter for blood transfusion     Environmental allergies        Past Surgical History:   Procedure Laterality Date     SECTION         Review of patient's allergies indicates:   Allergen Reactions    Vicodin [hydrocodone-acetaminophen] Itching    Hydrocodone Rash       No current facility-administered medications on file prior to encounter.     Current Outpatient Medications on File Prior to Encounter   Medication Sig    cetirizine (ZYRTEC) 10 MG tablet Take 10 mg by mouth once daily.    ferrous gluconate (FERGON) 324 MG tablet TK 1 T PO BID    fluticasone (FLONASE) 50 mcg/actuation nasal spray 1 spray by Each Nare route once daily.    fluticasone (FLOVENT HFA) 110 mcg/actuation inhaler Inhale 1 puff into the lungs 2 (two) times daily. Controller    folic acid (FOLVITE) 1 MG tablet Take 1 mg by mouth once daily.    ondansetron (ZOFRAN) 8 MG tablet Take 1 tablet (8 mg total) by mouth every 12 (twelve) hours as needed for Nausea.    pantoprazole (PROTONIX) 40 MG tablet Take 1 tablet (40 mg total) by mouth once daily.     Family History       Family history is unknown by patient.          Tobacco Use    Smoking status: Never    Smokeless tobacco: Never   Substance and Sexual Activity    Alcohol use: No    Drug use: No    Sexual activity: Yes     Review of Systems   Constitutional:  Negative for activity change, appetite change, chills and fever.   HENT:  Negative for congestion, sore throat and trouble swallowing.    Eyes:  Negative for photophobia and visual disturbance.   Respiratory:  Negative for cough, chest tightness and shortness of breath.    Cardiovascular:  Negative for chest pain, palpitations and leg swelling.   Gastrointestinal:  Positive for abdominal pain, diarrhea and nausea.   Genitourinary:  Negative for dysuria, flank pain and hematuria.   Musculoskeletal:  Negative for back pain.   Neurological:  Negative for  dizziness, weakness and headaches.   Psychiatric/Behavioral:  Negative for confusion.    Objective:     Vital Signs (Most Recent):  Temp: 98.2 °F (36.8 °C) (05/16/23 2221)  Pulse: 76 (05/17/23 0430)  Resp: 16 (05/17/23 0430)  BP: (!) 133/92 (05/17/23 0430)  SpO2: 95 % (05/17/23 0430) Vital Signs (24h Range):  Temp:  [98.2 °F (36.8 °C)] 98.2 °F (36.8 °C)  Pulse:  [69-94] 76  Resp:  [15-18] 16  SpO2:  [95 %-99 %] 95 %  BP: (120-160)/(72-99) 133/92     Weight: 64.4 kg (142 lb)  Body mass index is 25.97 kg/m².     Physical Exam  Vitals reviewed.   Constitutional:       Appearance: Normal appearance. She is normal weight.   HENT:      Head: Normocephalic.      Mouth/Throat:      Mouth: Mucous membranes are moist.      Pharynx: Oropharynx is clear.   Eyes:      Pupils: Pupils are equal, round, and reactive to light.   Cardiovascular:      Rate and Rhythm: Normal rate and regular rhythm.      Pulses: Normal pulses.      Heart sounds: Normal heart sounds.   Pulmonary:      Effort: Pulmonary effort is normal.      Breath sounds: Normal breath sounds.   Abdominal:      General: Bowel sounds are normal.      Palpations: Abdomen is soft.      Tenderness: There is abdominal tenderness in the right upper quadrant.   Musculoskeletal:         General: Normal range of motion.      Cervical back: Normal range of motion.   Skin:     General: Skin is warm and dry.   Neurological:      Mental Status: She is alert and oriented to person, place, and time. Mental status is at baseline.   Psychiatric:         Attention and Perception: Attention normal.         Mood and Affect: Mood normal.         Speech: Speech normal.            CRANIAL NERVES     CN III, IV, VI   Pupils are equal, round, and reactive to light.     Significant Labs: All pertinent labs within the past 24 hours have been reviewed.  CBC:   Recent Labs   Lab 05/16/23  2358   WBC 16.66*   HGB 10.0*   HCT 28.7*   *     CMP:   Recent Labs   Lab 05/16/23  2358      K  4.0      CO2 21*      BUN 6   CREATININE 0.9   CALCIUM 9.6   PROT 7.6   ALBUMIN 4.3   BILITOT 1.3*   ALKPHOS 95   *   ALT 83*   ANIONGAP 11       Significant Imaging: I have reviewed all pertinent imaging results/findings within the past 24 hours.  Imaging Results              CT Abdomen Pelvis With Contrast (In process)

## 2023-05-17 NOTE — HPI
Shi Saba is a 38-year-old female with a past medical history of sickle cell and avascular necrosis of left hip who presented to the ED complaining of right lower back pain and abdominal pain that started yesterday evening.  Patient states pain feels similar to that of her most recent sickle cell crisis on 01/14/2023.  Patient is followed by , Hem/Onc.  ED workup revealed WBC 16.6, hemoglobin 10, hematocrit 28.7, retic 4.2, UA negative for UTI, lipase 31 and , ALT 83 and T bili 1.3.  CT abdomen demonstrated distended gallbladder thickening with possible cholecystitis.  Patient received IV fluids and IV pain medication in the ED. ED provider spoke to General surgery and Hospital Medicine was consulted.  Patient seen in the ED and reports pain controlled with IV morphine.  She denies recent nausea and vomiting.  Patient also denies chest pain, shortness of breath, fever, chills, diarrhea and headaches.  Patient will be admitted to Hospital Medicine for further evaluation management.

## 2023-05-17 NOTE — ED NOTES
Patient ambulated back to room from restroom. She has been updated on admission. No other needs or questions at this time.

## 2023-05-17 NOTE — CONSULTS
[facility]  General Surgery  Consult Note    Inpatient consult to General Surgery  Consult performed by: Maik Escobedo MD  Consult ordered by: Opal Yeboah NP      Subjective:     Chief Complaint/Reason for Admission:  Acute cholecystitis    History of Present Illness:  This is a 38-year-old female who presented with right upper quadrant abdominal pain.  CT imaging in the ER was concerning for acute cholecystitis.  She would a white count.  She is a history of sickle cell disease.  Her only abdominal operation was a .  Currently, symptoms are well controlled, minimal pain.    No current facility-administered medications on file prior to encounter.     Current Outpatient Medications on File Prior to Encounter   Medication Sig    ferrous gluconate (FERGON) 324 MG tablet TK 1 T PO BID    folic acid (FOLVITE) 1 MG tablet Take 1 mg by mouth once daily.    cetirizine (ZYRTEC) 10 MG tablet Take 10 mg by mouth once daily.    fluticasone (FLONASE) 50 mcg/actuation nasal spray 1 spray by Each Nare route once daily.    fluticasone (FLOVENT HFA) 110 mcg/actuation inhaler Inhale 1 puff into the lungs 2 (two) times daily. Controller    ondansetron (ZOFRAN) 8 MG tablet Take 1 tablet (8 mg total) by mouth every 12 (twelve) hours as needed for Nausea.    pantoprazole (PROTONIX) 40 MG tablet Take 1 tablet (40 mg total) by mouth once daily.       Review of patient's allergies indicates:   Allergen Reactions    Vicodin [hydrocodone-acetaminophen] Itching    Hydrocodone Rash       Past Medical History:   Diagnosis Date    Avascular necrosis of hip left    Encounter for blood transfusion     Environmental allergies      Past Surgical History:   Procedure Laterality Date     SECTION       Family History       Family history is unknown by patient.          Tobacco Use    Smoking status: Never    Smokeless tobacco: Never   Substance and Sexual Activity    Alcohol use: No    Drug use: No    Sexual  activity: Yes     Review of Systems   Constitutional:  Negative for fever.   HENT: Negative.     Eyes: Negative.    Respiratory: Negative.     Cardiovascular: Negative.    Gastrointestinal:  Positive for abdominal pain.   Endocrine: Negative.    Genitourinary: Negative.    Musculoskeletal: Negative.    Skin: Negative.    Allergic/Immunologic: Negative.    Neurological: Negative.    Hematological: Negative.    Psychiatric/Behavioral: Negative.     Objective:     Vital Signs (Most Recent):  Temp: 97.7 °F (36.5 °C) (05/17/23 1133)  Pulse: (!) 59 (05/17/23 1133)  Resp: 18 (05/17/23 1133)  BP: 137/81 (05/17/23 1133)  SpO2: 97 % (05/17/23 1133) Vital Signs (24h Range):  Temp:  [97.5 °F (36.4 °C)-98.2 °F (36.8 °C)] 97.7 °F (36.5 °C)  Pulse:  [56-94] 59  Resp:  [15-18] 18  SpO2:  [95 %-99 %] 97 %  BP: (120-160)/(72-99) 137/81     Weight: 64.4 kg (142 lb)  Body mass index is 25.97 kg/m².      Intake/Output Summary (Last 24 hours) at 5/17/2023 1342  Last data filed at 5/17/2023 0338  Gross per 24 hour   Intake 1000 ml   Output --   Net 1000 ml       Physical Exam  Constitutional:       General: She is not in acute distress.     Appearance: Normal appearance. She is not ill-appearing, toxic-appearing or diaphoretic.   HENT:      Head: Normocephalic.      Nose: Nose normal.   Eyes:      Conjunctiva/sclera: Conjunctivae normal.   Cardiovascular:      Rate and Rhythm: Normal rate and regular rhythm.   Pulmonary:      Effort: Pulmonary effort is normal.   Abdominal:      Tenderness: There is abdominal tenderness.   Musculoskeletal:         General: Normal range of motion.      Cervical back: Normal range of motion.   Skin:     General: Skin is warm.   Neurological:      General: No focal deficit present.      Mental Status: She is alert.   Psychiatric:         Mood and Affect: Mood normal.       Significant Labs:  CBC:   Recent Labs   Lab 05/17/23  0633   WBC 14.32*   RBC 4.39   HGB 9.6*   HCT 28.1*   *   MCV 64*   MCH  21.9*   MCHC 34.2     CMP:   Recent Labs   Lab 05/17/23  0633      CALCIUM 9.5   ALBUMIN 3.9   PROT 6.9      K 4.1   CO2 24   *   BUN 5*   CREATININE 0.9   ALKPHOS 88   ALT 66*   AST 59*   BILITOT 1.1*     Coagulation: No results for input(s): PT, INR, APTT in the last 48 hours.  Lactic Acid: No results for input(s): LACTATE in the last 48 hours.    Significant Diagnostics:  CT and ultrasound were reviewed.  There is gallbladder wall thickening, pericholecystic fluid.  There are stones seen on recent ultrasound that I ordered this morning.    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  Risks versus benefits of cholecystectomy were discussed.  Consent was obtained.    IV antibiotics   NPO   Plan for operative intervention this afternoon.      Thank you for your consult. I will follow-up with patient. Please contact us if you have any additional questions.    Maik Escobedo MD  General Surgery  [facility]

## 2023-05-17 NOTE — PLAN OF CARE
[facility]  Initial Discharge Assessment       Primary Care Provider: Jagdish Whitley MD    Admission Diagnosis: Cholecystitis [K81.9]  Chest pain [R07.9]    Admission Date: 5/17/2023  Expected Discharge Date:     Transition of Care Barriers: None    Payor: MEDICAID / Plan: [employer] Psychiatric / Product Type: Managed Medicaid /     Extended Emergency Contact Information  Primary Emergency Contact: JayantConrado  Address: [address] [place] [address] [place] [address] [facility]  Home Phone: [phone]  Mobile Phone: [phone]  Relation: Spouse    Discharge Plan A: Home with family  Discharge Plan B: [facility]      [id number] - [place], [place] - [address] AT [facility] [address]  [address]  [facility] [address]  Phone: [phone] Fax: [phone]      Initial Assessment (most recent)       Adult Discharge Assessment - 05/17/23 1504          Discharge Assessment    Assessment Type Discharge Planning Assessment     Confirmed/corrected address, phone number and insurance Yes     Confirmed Demographics Correct on Facesheet     Source of Information patient     Does patient/caregiver understand observation status Yes     People in Home spouse     Do you expect to return to your current living situation? Yes     Do you have help at home or someone to help you manage your care at home? Yes     Prior to hospitilization cognitive status: Alert/Oriented     Current cognitive status: Alert/Oriented     Readmission within 30 days? No     Patient currently being followed by outpatient case management? No     Do you currently have service(s) that help you manage your care at home? No     Do you take prescription medications? Yes     Do you have prescription coverage? Yes     Do you have any problems affording any of your prescribed medications? No     Is the patient taking medications as prescribed? yes     How do  you get to doctors appointments? car, drives self     Are you on dialysis? No     Do you take coumadin? No     Discharge Plan A Home with family     Discharge Plan B Home Health     DME Needed Upon Discharge  none     Discharge Plan discussed with: Patient     Transition of Care Barriers None

## 2023-05-17 NOTE — FIRST PROVIDER EVALUATION
" Emergency Department TeleTriage Encounter Note      CHIEF COMPLAINT    Chief Complaint   Patient presents with    Back Pain     Along right side of back from bottom to top with right sided abdominal pain. Feels like may be having a sickle cell crisis       VITAL SIGNS   Initial Vitals [05/16/23 2221]   BP Pulse Resp Temp SpO2   (!) 160/99 94 16 98.2 °F (36.8 °C) 98 %      MAP       --            ALLERGIES    Review of patient's allergies indicates:   Allergen Reactions    Vicodin [hydrocodone-acetaminophen] Itching    Hydrocodone Rash       PROVIDER TRIAGE NOTE  This is a teletriage evaluation of a 38 y.o. female presenting to the ED with c/o "I think im having a sickle cell crisis."  "My abd to my back is hurting"  (+) vomiting.  Started about 1930.      ROS: (-) fever, (-) rash  PE:  NAD    Initial orders will be placed and care will be transferred to an alternate provider when patient is roomed for a full evaluation. Any additional orders and the final disposition will be determined by that provider.         ORDERS  Labs Reviewed   HIV 1 / 2 ANTIBODY   HEPATITIS C ANTIBODY       ED Orders (720h ago, onward)      Start Ordered     Status Ordering Provider    05/16/23 2241 05/16/23 2240  Reticulocytes  Once         Ordered JAXON LOYA    05/16/23 2241 05/16/23 2240  Saline lock IV  Once         Ordered JAXON LOYA    05/16/23 2241 05/16/23 2240  Vital signs  Every 15 min         Ordered JAXON LOYA    05/16/23 2241 05/16/23 2240  CBC auto differential  STAT         Ordered JAXON LOYA    05/16/23 2241 05/16/23 2240  Comprehensive metabolic panel  STAT         Ordered JAXON LOYA    05/16/23 2241 05/16/23 2240  Urinalysis, Reflex to Urine Culture Urine, Clean Catch  STAT         Ordered JAXON LOYA    05/16/23 2241 05/16/23 2240  POCT urine pregnancy  Once         Ordered JAXON LOYA    05/16/23 2241 05/16/23 2240  Lipase  STAT         Ordered JAXON LOYA    " 05/16/23 2223 05/16/23 2222  HIV 1/2 Ag/Ab (4th Gen)  STAT         Pending Collection ZARIA ETIENNE    05/16/23 2223 05/16/23 2222  Hepatitis C Antibody  STAT         Pending Collection ZARIA ETIENNE.              Virtual Visit Note: The provider triage portion of this emergency department evaluation and documentation was performed via [employer], a HIPAA-compliant telemedicine application, in concert with a tele-presenter in the room. A face to face patient evaluation with one of my colleagues will occur once the patient is placed in an emergency department room.      DISCLAIMER: This note was prepared with [employer] voice recognition transcription software. Garbled syntax, mangled pronouns, and other bizarre constructions may be attributed to that software system.

## 2023-05-17 NOTE — ED PROVIDER NOTES
Encounter Date: 2023    SCRIBE #1 NOTE: IBren, am scribing for, and in the presence of,  Monico Richey MD.     History     Chief Complaint   Patient presents with    Back Pain     Along right side of back from bottom to top with right sided abdominal pain. Feels like may be having a sickle cell crisis     Time seen by provider: 1:57 AM on 2023    Shi Saba is a 38 y.o. female who presents to the ED with an onset of lower back pain and abdominal pain that began yesterday evening which the patient believes to be a sickle cell crisis. Patient reports associated Sx of N/V. The patient states that Sx feel similar to the most recent sickle cell crisis on 2023. The patient states she usually has bout 2-3 ED visits per year for sickle cell crisis with the last on 2023. The patient states her blood count normally runs at 8. She reports following with Dr. Bethea (Hem/Onc) for sickle cell. Patient states Morphine usually improves Sx during major sickle cell crisis'. She denies taking medication for pain since Sx onset. The patient denies fever, chest pain, SOB, cough, rhinorrhea, congestion, diarrhea, constipation, dysuria, hematuria, or any other symptoms at this time. She denies Hx of stroke or splenectomy. PMHx of Avascular necrosis of L hip and blood transfusion (2023). PSHx of .       The history is provided by the patient.   Review of patient's allergies indicates:   Allergen Reactions    Vicodin [hydrocodone-acetaminophen] Itching    Hydrocodone Rash     Past Medical History:   Diagnosis Date    Avascular necrosis of hip left    Encounter for blood transfusion     Environmental allergies      Past Surgical History:   Procedure Laterality Date     SECTION       Family History   Family history unknown: Yes     Social History     Tobacco Use    Smoking status: Never    Smokeless tobacco: Never   Substance Use Topics    Alcohol use: No    Drug use: No      Review of Systems   Constitutional:  Negative for activity change, diaphoresis and fever.   HENT:  Negative for congestion, ear pain, rhinorrhea, sore throat and trouble swallowing.    Eyes:  Negative for pain and visual disturbance.   Respiratory:  Negative for cough, shortness of breath and stridor.    Cardiovascular:  Negative for chest pain.   Gastrointestinal:  Positive for abdominal pain, nausea and vomiting. Negative for blood in stool, constipation and diarrhea.   Genitourinary:  Negative for dysuria, hematuria, vaginal bleeding and vaginal discharge.   Musculoskeletal:  Positive for back pain. Negative for gait problem.   Skin:  Negative for rash and wound.   Neurological:  Negative for seizures and headaches.   Psychiatric/Behavioral:  Negative for hallucinations and suicidal ideas.      Physical Exam     Initial Vitals [05/16/23 2221]   BP Pulse Resp Temp SpO2   (!) 160/99 94 16 98.2 °F (36.8 °C) 98 %      MAP       --         Physical Exam    Nursing note and vitals reviewed.  Constitutional: She appears well-developed. She is not diaphoretic. No distress.   HENT:   Head: Normocephalic and atraumatic.   Nose: Nose normal.   Eyes: EOM are normal. No scleral icterus.   Neck: Neck supple.   Normal range of motion.  Cardiovascular:  Normal rate, regular rhythm, normal heart sounds and intact distal pulses.     Exam reveals no gallop and no friction rub.       No murmur heard.  Pulmonary/Chest: Breath sounds normal. No stridor. No respiratory distress. She has no wheezes. She has no rhonchi. She has no rales.   Abdominal: Abdomen is soft. Bowel sounds are normal. She exhibits no distension. There is abdominal tenderness in the right upper quadrant.   No rigidity on exam.    No right CVA tenderness.  No left CVA tenderness. There is no rebound and no guarding.   Musculoskeletal:         General: Normal range of motion.      Cervical back: Normal range of motion and neck supple.     Neurological: She is  alert and oriented to person, place, and time. She has normal strength. No cranial nerve deficit or sensory deficit.   Skin: Skin is warm and dry. Capillary refill takes less than 2 seconds. No rash noted.   Psychiatric: She has a normal mood and affect.       ED Course   Procedures  Labs Reviewed   RETICULOCYTES - Abnormal; Notable for the following components:       Result Value    Retic 4.2 (*)     All other components within normal limits   CBC W/ AUTO DIFFERENTIAL - Abnormal; Notable for the following components:    WBC 16.66 (*)     Hemoglobin 10.0 (*)     Hematocrit 28.7 (*)     MCV 65 (*)     MCH 22.5 (*)     RDW 17.8 (*)     Platelets 464 (*)     Gran # (ANC) 13.2 (*)     Immature Grans (Abs) 0.06 (*)     nRBC 2 (*)     Gran % 79.3 (*)     Lymph % 13.3 (*)     All other components within normal limits   COMPREHENSIVE METABOLIC PANEL - Abnormal; Notable for the following components:    CO2 21 (*)     Total Bilirubin 1.3 (*)      (*)     ALT 83 (*)     All other components within normal limits   URINALYSIS, REFLEX TO URINE CULTURE    Narrative:     Specimen Source->Urine   LIPASE   PREGNANCY TEST, URINE RAPID    Narrative:     Specimen Source->Urine   HIV 1 / 2 ANTIBODY   HEPATITIS C ANTIBODY   COMPREHENSIVE METABOLIC PANEL   CBC W/ AUTO DIFFERENTIAL   POCT URINE PREGNANCY          Imaging Results              CT Abdomen Pelvis With Contrast (In process)                      Medications   ferrous gluconate tablet 324 mg (has no administration in time range)   folic acid tablet 1 mg (has no administration in time range)   sodium chloride 0.9% flush 10 mL (has no administration in time range)   melatonin tablet 6 mg (has no administration in time range)   ondansetron injection 4 mg (has no administration in time range)   prochlorperazine injection Soln 5 mg (has no administration in time range)   senna-docusate 8.6-50 mg per tablet 1 tablet (has no administration in time range)   acetaminophen tablet 650  mg (has no administration in time range)   simethicone chewable tablet 80 mg (has no administration in time range)   aluminum-magnesium hydroxide-simethicone 200-200-20 mg/5 mL suspension 30 mL (has no administration in time range)   naloxone 0.4 mg/mL injection 0.02 mg (has no administration in time range)   glucagon (human recombinant) injection 1 mg (has no administration in time range)   dextrose 10% bolus 125 mL 125 mL (has no administration in time range)   dextrose 10% bolus 250 mL 250 mL (has no administration in time range)   dextrose 40 % gel 15,000 mg (has no administration in time range)   dextrose 40 % gel 30,000 mg (has no administration in time range)   0.9%  NaCl infusion ( Intravenous Incomplete 5/17/23 0616)   morphine injection 4 mg (has no administration in time range)   morphine injection 8 mg (has no administration in time range)   piperacillin-tazobactam (ZOSYN) 4.5 g in dextrose 5 % in water (D5W) 5 % 100 mL IVPB (MB+) (4.5 g Intravenous Incomplete 5/17/23 0616)   morphine injection 8 mg (8 mg Intravenous Given 5/17/23 0229)   ondansetron injection 4 mg (4 mg Intravenous Given 5/17/23 0227)   sodium chloride 0.9% bolus 1,000 mL 1,000 mL (0 mLs Intravenous Stopped 5/17/23 0338)   iohexoL (OMNIPAQUE 350) 350 mg iodine/mL injection (75 mLs  Given 5/17/23 0219)     Medical Decision Making:   History:   Old Medical Records: I decided to obtain old medical records.  Clinical Tests:   Lab Tests: Ordered and Reviewed  Radiological Study: Ordered and Reviewed  ED Management:  38-year-old female past medical history of sickle cell disease presents today with right upper quadrant abdominal pain.  Positive Christiansen sign on exam.  History and exam not consistent with AAA, pancreatitis, SBO, appendicitis, mesenteric ischemia, nephrolithiasis, pyelonephritis, or diverticulitis.    ED Interventions: analgesia PRN  ED Workup: CBC, BMP, LFTs, Lipase, CT    Labs show leukocytosis and very mild transaminitis.  CT  concerning for cholecystitis.    Discussed case with General surgery and Hospital Medicine further management of cholecystitis.            Scribe Attestation:   Scribe #1: I performed the above scribed service and the documentation accurately describes the services I performed. I attest to the accuracy of the note.      ED Course as of 05/17/23 0624   Wed May 17, 2023   0207 WBC(!): 16.66 [BD]   0207 Retic(!): 4.2 [BD]   0207 BILIRUBIN TOTAL(!): 1.3 [BD]   0207 AST(!): 115 [BD]   0207 ALT(!): 83 [BD]   0207 BP(!): 160/99 [BD]   0207 Temp: 98.2 °F (36.8 °C) [BD]   0207 Pulse: 94 [BD]   0207 Resp: 16 [BD]   0207 SpO2: 98 % [BD]   0503 IMPRESSION:  Distended gallbladder with possible cholecystitis. Biliary dilation similar to previous.  Thank you for allowing us to participate in the care of your patient.  Dictated and Authenticated by: Gilmer Starks MD  05/17/2023 4:53 AM Central Time ([place] & [place]) [BD]   0620 Discussed case with General surgery on-call, Dr. Escobedo, who agrees with plan for admission and will evaluate in the morning [BD]      ED Course User Index  [BD] Monico Richey MD                 Attending Attestation:     Physician Attestation for Scribe:    I, Dr. Monico Richey, personally performed the services described in this documentation.   All medical record entries made by the scribe were at my direction and in my presence.   I have reviewed the chart and agree that the record is accurate and complete.   Monico Richey MD  6:24 AM 05/17/2023     DISCLAIMER: This note was prepared with [employer] Naturally Speaking voice recognition transcription software. Garbled syntax, mangled pronouns, and other bizarre constructions may be attributed to that software system.    Clinical Impression:   Final diagnoses:  [K81.9] Cholecystitis (Primary)        ED Disposition Condition    Observation Stable                Monico Richey MD  05/17/23 0624

## 2023-05-17 NOTE — ED NOTES
Patient ambulated to the restroom without difficulty. Gait is steady.   Patient denies any needs or questions at this time. Call light is within reach.

## 2023-05-17 NOTE — ASSESSMENT & PLAN NOTE
-Consult General Surgery, recommendations appreciated  -PRN pain medication   -PRN antiemetics  -IV abx

## 2023-05-17 NOTE — H&P
[facility]  History & Physical    Patient Name: Shi Saba  MRN: [id number]  Patient Class: OP- Observation  Admission Date: 2023  Attending Physician: Az Jurado MD   Primary Care Provider: Jagdish Whitley MD         Patient information was obtained from patient, past medical records and ER records.     Subjective:     Principal Problem:Cholecystitis, acute    Chief Complaint:   Chief Complaint   Patient presents with    Back Pain     Along right side of back from bottom to top with right sided abdominal pain. Feels like may be having a sickle cell crisis        HPI: Shi Saba is a 38-year-old female with a past medical history of sickle cell and avascular necrosis of left hip who presented to the ED complaining of right lower back pain and abdominal pain that started yesterday evening.  Patient states pain feels similar to that of her most recent sickle cell crisis on 2023.  Patient is followed by , Hem/Onc.  ED workup revealed WBC 16.6, hemoglobin 10, hematocrit 28.7, retic 4.2, UA negative for UTI, lipase 31 and , ALT 83 and T bili 1.3.  CT abdomen demonstrated distended gallbladder thickening with possible cholecystitis.  Patient received IV fluids and IV pain medication in the ED. ED provider spoke to General surgery and Hospital Medicine was consulted.  Patient seen in the ED and reports pain controlled with IV morphine.  She denies recent nausea and vomiting.  Patient also denies chest pain, shortness of breath, fever, chills, diarrhea and headaches.  Patient will be admitted to Hospital Medicine for further evaluation management.               Past Medical History:   Diagnosis Date    Avascular necrosis of hip left    Encounter for blood transfusion     Environmental allergies        Past Surgical History:   Procedure Laterality Date     SECTION         Review of patient's allergies  indicates:   Allergen Reactions    Vicodin [hydrocodone-acetaminophen] Itching    Hydrocodone Rash       No current facility-administered medications on file prior to encounter.     Current Outpatient Medications on File Prior to Encounter   Medication Sig    cetirizine (ZYRTEC) 10 MG tablet Take 10 mg by mouth once daily.    ferrous gluconate (FERGON) 324 MG tablet TK 1 T PO BID    fluticasone (FLONASE) 50 mcg/actuation nasal spray 1 spray by Each Nare route once daily.    fluticasone (FLOVENT HFA) 110 mcg/actuation inhaler Inhale 1 puff into the lungs 2 (two) times daily. Controller    folic acid (FOLVITE) 1 MG tablet Take 1 mg by mouth once daily.    ondansetron (ZOFRAN) 8 MG tablet Take 1 tablet (8 mg total) by mouth every 12 (twelve) hours as needed for Nausea.    pantoprazole (PROTONIX) 40 MG tablet Take 1 tablet (40 mg total) by mouth once daily.     Family History       Family history is unknown by patient.          Tobacco Use    Smoking status: Never    Smokeless tobacco: Never   Substance and Sexual Activity    Alcohol use: No    Drug use: No    Sexual activity: Yes     Review of Systems   Constitutional:  Negative for activity change, appetite change, chills and fever.   HENT:  Negative for congestion, sore throat and trouble swallowing.    Eyes:  Negative for photophobia and visual disturbance.   Respiratory:  Negative for cough, chest tightness and shortness of breath.    Cardiovascular:  Negative for chest pain, palpitations and leg swelling.   Gastrointestinal:  Positive for abdominal pain, diarrhea and nausea.   Genitourinary:  Negative for dysuria, flank pain and hematuria.   Musculoskeletal:  Negative for back pain.   Neurological:  Negative for dizziness, weakness and headaches.   Psychiatric/Behavioral:  Negative for confusion.    Objective:     Vital Signs (Most Recent):  Temp: 98.2 °F (36.8 °C) (05/16/23 2221)  Pulse: 76 (05/17/23 0430)  Resp: 16 (05/17/23 0430)  BP: (!) 133/92  (05/17/23 0430)  SpO2: 95 % (05/17/23 0430) Vital Signs (24h Range):  Temp:  [98.2 °F (36.8 °C)] 98.2 °F (36.8 °C)  Pulse:  [69-94] 76  Resp:  [15-18] 16  SpO2:  [95 %-99 %] 95 %  BP: (120-160)/(72-99) 133/92     Weight: 64.4 kg (142 lb)  Body mass index is 25.97 kg/m².     Physical Exam  Vitals reviewed.   Constitutional:       Appearance: Normal appearance. She is normal weight.   HENT:      Head: Normocephalic.      Mouth/Throat:      Mouth: Mucous membranes are moist.      Pharynx: Oropharynx is clear.   Eyes:      Pupils: Pupils are equal, round, and reactive to light.   Cardiovascular:      Rate and Rhythm: Normal rate and regular rhythm.      Pulses: Normal pulses.      Heart sounds: Normal heart sounds.   Pulmonary:      Effort: Pulmonary effort is normal.      Breath sounds: Normal breath sounds.   Abdominal:      General: Bowel sounds are normal.      Palpations: Abdomen is soft.      Tenderness: There is abdominal tenderness in the right upper quadrant.   Musculoskeletal:         General: Normal range of motion.      Cervical back: Normal range of motion.   Skin:     General: Skin is warm and dry.   Neurological:      Mental Status: She is alert and oriented to person, place, and time. Mental status is at baseline.   Psychiatric:         Attention and Perception: Attention normal.         Mood and Affect: Mood normal.         Speech: Speech normal.            CRANIAL NERVES     CN III, IV, VI   Pupils are equal, round, and reactive to light.     Significant Labs: All pertinent labs within the past 24 hours have been reviewed.  CBC:   Recent Labs   Lab 05/16/23  2358   WBC 16.66*   HGB 10.0*   HCT 28.7*   *     CMP:   Recent Labs   Lab 05/16/23  2358      K 4.0      CO2 21*      BUN 6   CREATININE 0.9   CALCIUM 9.6   PROT 7.6   ALBUMIN 4.3   BILITOT 1.3*   ALKPHOS 95   *   ALT 83*   ANIONGAP 11       Significant Imaging: I have reviewed all pertinent imaging  results/findings within the past 24 hours.  Imaging Results              CT Abdomen Pelvis With Contrast (In process)                       Assessment/Plan:     * Cholecystitis, acute  -Consult General Surgery, recommendations appreciated  -PRN pain medication   -PRN antiemetics  -IV abx    Sickle cell beta thalassemia  History noted    -Trend CBC  -Monitor for acute changes.        VTE Risk Mitigation (From admission, onward)         Ordered     IP VTE LOW RISK PATIENT  Once         05/17/23 0600     Place sequential compression device  Until discontinued         05/17/23 0600                       Opal Yeboah NP  Department of Hospital Medicine  [facility] - Emergency Dept

## 2023-05-17 NOTE — PROGRESS NOTES
Pharmacist Renal Dose Adjustment Note    Shi Saba is a 38 y.o. female being treated with the medication zosyn    Patient Data:    Vital Signs (Most Recent):  Temp: 98 °F (36.7 °C) (05/17/23 0545)  Pulse: 76 (05/17/23 0545)  Resp: 16 (05/17/23 0545)  BP: (!) 152/89 (05/17/23 0545)  SpO2: 98 % (05/17/23 0545) Vital Signs (72h Range):  Temp:  [98 °F (36.7 °C)-98.2 °F (36.8 °C)]   Pulse:  [69-94]   Resp:  [15-18]   BP: (120-160)/(72-99)   SpO2:  [95 %-99 %]      Recent Labs   Lab 05/16/23 2358   CREATININE 0.9     Serum creatinine: 0.9 mg/dL 05/16/23 2358  Estimated creatinine clearance: 74.7 mL/min    Medication:Zosyn dose: 3.375g frequency q6h will be changed to medication:Zosyn dose:4.5g frequency:q8h    Pharmacist's Name: Brain Powers  Pharmacist's Extension: 1429

## 2023-05-18 ENCOUNTER — ANESTHESIA EVENT (OUTPATIENT)
Dept: SURGERY | Facility: HOSPITAL | Age: 38
End: 2023-05-18
Payer: MEDICAID

## 2023-05-18 ENCOUNTER — ANESTHESIA (OUTPATIENT)
Dept: SURGERY | Facility: HOSPITAL | Age: 38
End: 2023-05-18
Payer: MEDICAID

## 2023-05-18 VITALS
HEIGHT: 62 IN | WEIGHT: 142 LBS | TEMPERATURE: 98 F | OXYGEN SATURATION: 96 % | HEART RATE: 98 BPM | SYSTOLIC BLOOD PRESSURE: 132 MMHG | BODY MASS INDEX: 26.13 KG/M2 | DIASTOLIC BLOOD PRESSURE: 91 MMHG | RESPIRATION RATE: 17 BRPM

## 2023-05-18 LAB
ALBUMIN SERPL BCP-MCNC: 4 G/DL (ref 3.5–5.2)
ALP SERPL-CCNC: 86 U/L (ref 55–135)
ALT SERPL W/O P-5'-P-CCNC: 55 U/L (ref 10–44)
ANION GAP SERPL CALC-SCNC: 11 MMOL/L (ref 8–16)
AST SERPL-CCNC: 36 U/L (ref 10–40)
BASOPHILS # BLD AUTO: 0.06 K/UL (ref 0–0.2)
BASOPHILS NFR BLD: 0.6 % (ref 0–1.9)
BILIRUB SERPL-MCNC: 1.3 MG/DL (ref 0.1–1)
BUN SERPL-MCNC: 5 MG/DL (ref 6–20)
CALCIUM SERPL-MCNC: 9.8 MG/DL (ref 8.7–10.5)
CHLORIDE SERPL-SCNC: 107 MMOL/L (ref 95–110)
CO2 SERPL-SCNC: 24 MMOL/L (ref 23–29)
CREAT SERPL-MCNC: 1.2 MG/DL (ref 0.5–1.4)
DIFFERENTIAL METHOD: ABNORMAL
EOSINOPHIL # BLD AUTO: 0.4 K/UL (ref 0–0.5)
EOSINOPHIL NFR BLD: 3.6 % (ref 0–8)
ERYTHROCYTE [DISTWIDTH] IN BLOOD BY AUTOMATED COUNT: 18.1 % (ref 11.5–14.5)
EST. GFR  (NO RACE VARIABLE): 59 ML/MIN/1.73 M^2
GLUCOSE SERPL-MCNC: 80 MG/DL (ref 70–110)
HCT VFR BLD AUTO: 28.5 % (ref 37–48.5)
HGB BLD-MCNC: 9.6 G/DL (ref 12–16)
IMM GRANULOCYTES # BLD AUTO: 0.04 K/UL (ref 0–0.04)
IMM GRANULOCYTES NFR BLD AUTO: 0.4 % (ref 0–0.5)
LYMPHOCYTES # BLD AUTO: 2.8 K/UL (ref 1–4.8)
LYMPHOCYTES NFR BLD: 26.2 % (ref 18–48)
MCH RBC QN AUTO: 21.9 PG (ref 27–31)
MCHC RBC AUTO-ENTMCNC: 33.7 G/DL (ref 32–36)
MCV RBC AUTO: 65 FL (ref 82–98)
MONOCYTES # BLD AUTO: 0.8 K/UL (ref 0.3–1)
MONOCYTES NFR BLD: 7.3 % (ref 4–15)
NEUTROPHILS # BLD AUTO: 6.6 K/UL (ref 1.8–7.7)
NEUTROPHILS NFR BLD: 61.9 % (ref 38–73)
NRBC BLD-RTO: 2 /100 WBC
PLATELET # BLD AUTO: 440 K/UL (ref 150–450)
PMV BLD AUTO: 9.5 FL (ref 9.2–12.9)
POTASSIUM SERPL-SCNC: 3.9 MMOL/L (ref 3.5–5.1)
PROT SERPL-MCNC: 7.1 G/DL (ref 6–8.4)
RBC # BLD AUTO: 4.38 M/UL (ref 4–5.4)
SODIUM SERPL-SCNC: 142 MMOL/L (ref 136–145)
WBC # BLD AUTO: 10.58 K/UL (ref 3.9–12.7)

## 2023-05-18 PROCEDURE — D9220A PRA ANESTHESIA: ICD-10-PCS | Mod: ANES,,, | Performed by: ANESTHESIOLOGY

## 2023-05-18 PROCEDURE — 37000009 HC ANESTHESIA EA ADD 15 MINS: Performed by: STUDENT IN AN ORGANIZED HEALTH CARE EDUCATION/TRAINING PROGRAM

## 2023-05-18 PROCEDURE — 47562 LAPAROSCOPIC CHOLECYSTECTOMY: CPT | Mod: ,,, | Performed by: STUDENT IN AN ORGANIZED HEALTH CARE EDUCATION/TRAINING PROGRAM

## 2023-05-18 PROCEDURE — 27201423 OPTIME MED/SURG SUP & DEVICES STERILE SUPPLY: Performed by: STUDENT IN AN ORGANIZED HEALTH CARE EDUCATION/TRAINING PROGRAM

## 2023-05-18 PROCEDURE — 00790 ANES IPER UPR ABD NOS: CPT | Performed by: STUDENT IN AN ORGANIZED HEALTH CARE EDUCATION/TRAINING PROGRAM

## 2023-05-18 PROCEDURE — D9220A PRA ANESTHESIA: Mod: ANES,,, | Performed by: ANESTHESIOLOGY

## 2023-05-18 PROCEDURE — 99900103 DSU ONLY-NO CHARGE-INITIAL HR (STAT): Performed by: STUDENT IN AN ORGANIZED HEALTH CARE EDUCATION/TRAINING PROGRAM

## 2023-05-18 PROCEDURE — 63600175 PHARM REV CODE 636 W HCPCS: Performed by: NURSE ANESTHETIST, CERTIFIED REGISTERED

## 2023-05-18 PROCEDURE — 25000003 PHARM REV CODE 250: Performed by: NURSE ANESTHETIST, CERTIFIED REGISTERED

## 2023-05-18 PROCEDURE — 47562 PR LAP,CHOLECYSTECTOMY: ICD-10-PCS | Mod: ,,, | Performed by: STUDENT IN AN ORGANIZED HEALTH CARE EDUCATION/TRAINING PROGRAM

## 2023-05-18 PROCEDURE — 71000039 HC RECOVERY, EACH ADD'L HOUR: Performed by: STUDENT IN AN ORGANIZED HEALTH CARE EDUCATION/TRAINING PROGRAM

## 2023-05-18 PROCEDURE — D9220A PRA ANESTHESIA: ICD-10-PCS | Mod: CRNA,,, | Performed by: NURSE ANESTHETIST, CERTIFIED REGISTERED

## 2023-05-18 PROCEDURE — 36415 COLL VENOUS BLD VENIPUNCTURE: CPT | Performed by: NURSE PRACTITIONER

## 2023-05-18 PROCEDURE — 37000008 HC ANESTHESIA 1ST 15 MINUTES: Performed by: STUDENT IN AN ORGANIZED HEALTH CARE EDUCATION/TRAINING PROGRAM

## 2023-05-18 PROCEDURE — 99900035 HC TECH TIME PER 15 MIN (STAT)

## 2023-05-18 PROCEDURE — 94799 UNLISTED PULMONARY SVC/PX: CPT

## 2023-05-18 PROCEDURE — 94760 N-INVAS EAR/PLS OXIMETRY 1: CPT

## 2023-05-18 PROCEDURE — 88304 PR  SURG PATH,LEVEL III: ICD-10-PCS | Mod: 26,,, | Performed by: STUDENT IN AN ORGANIZED HEALTH CARE EDUCATION/TRAINING PROGRAM

## 2023-05-18 PROCEDURE — 96361 HYDRATE IV INFUSION ADD-ON: CPT

## 2023-05-18 PROCEDURE — 80053 COMPREHEN METABOLIC PANEL: CPT | Performed by: NURSE PRACTITIONER

## 2023-05-18 PROCEDURE — 63600175 PHARM REV CODE 636 W HCPCS: Performed by: STUDENT IN AN ORGANIZED HEALTH CARE EDUCATION/TRAINING PROGRAM

## 2023-05-18 PROCEDURE — 88304 TISSUE EXAM BY PATHOLOGIST: CPT | Mod: 26,,, | Performed by: STUDENT IN AN ORGANIZED HEALTH CARE EDUCATION/TRAINING PROGRAM

## 2023-05-18 PROCEDURE — 36000710: Performed by: STUDENT IN AN ORGANIZED HEALTH CARE EDUCATION/TRAINING PROGRAM

## 2023-05-18 PROCEDURE — 88304 TISSUE EXAM BY PATHOLOGIST: CPT | Performed by: STUDENT IN AN ORGANIZED HEALTH CARE EDUCATION/TRAINING PROGRAM

## 2023-05-18 PROCEDURE — 63600175 PHARM REV CODE 636 W HCPCS: Performed by: NURSE PRACTITIONER

## 2023-05-18 PROCEDURE — 25000003 PHARM REV CODE 250: Performed by: ANESTHESIOLOGY

## 2023-05-18 PROCEDURE — 25000003 PHARM REV CODE 250: Performed by: STUDENT IN AN ORGANIZED HEALTH CARE EDUCATION/TRAINING PROGRAM

## 2023-05-18 PROCEDURE — 96366 THER/PROPH/DIAG IV INF ADDON: CPT | Mod: 59

## 2023-05-18 PROCEDURE — 85025 COMPLETE CBC W/AUTO DIFF WBC: CPT | Performed by: NURSE PRACTITIONER

## 2023-05-18 PROCEDURE — 71000033 HC RECOVERY, INTIAL HOUR: Performed by: STUDENT IN AN ORGANIZED HEALTH CARE EDUCATION/TRAINING PROGRAM

## 2023-05-18 PROCEDURE — 36000711: Performed by: STUDENT IN AN ORGANIZED HEALTH CARE EDUCATION/TRAINING PROGRAM

## 2023-05-18 PROCEDURE — 99900104 DSU ONLY-NO CHARGE-EA ADD'L HR (STAT): Performed by: STUDENT IN AN ORGANIZED HEALTH CARE EDUCATION/TRAINING PROGRAM

## 2023-05-18 PROCEDURE — D9220A PRA ANESTHESIA: Mod: CRNA,,, | Performed by: NURSE ANESTHETIST, CERTIFIED REGISTERED

## 2023-05-18 PROCEDURE — 63600175 PHARM REV CODE 636 W HCPCS: Performed by: ANESTHESIOLOGY

## 2023-05-18 PROCEDURE — G0378 HOSPITAL OBSERVATION PER HR: HCPCS

## 2023-05-18 PROCEDURE — 25000003 PHARM REV CODE 250: Performed by: NURSE PRACTITIONER

## 2023-05-18 PROCEDURE — 96375 TX/PRO/DX INJ NEW DRUG ADDON: CPT

## 2023-05-18 PROCEDURE — 94761 N-INVAS EAR/PLS OXIMETRY MLT: CPT

## 2023-05-18 RX ORDER — FENTANYL CITRATE 50 UG/ML
25 INJECTION, SOLUTION INTRAMUSCULAR; INTRAVENOUS EVERY 5 MIN PRN
Status: COMPLETED | OUTPATIENT
Start: 2023-05-18 | End: 2023-05-18

## 2023-05-18 RX ORDER — LABETALOL HYDROCHLORIDE 5 MG/ML
INJECTION, SOLUTION INTRAVENOUS
Status: DISCONTINUED | OUTPATIENT
Start: 2023-05-18 | End: 2023-05-18

## 2023-05-18 RX ORDER — ACETAMINOPHEN 10 MG/ML
INJECTION, SOLUTION INTRAVENOUS
Status: DISCONTINUED | OUTPATIENT
Start: 2023-05-18 | End: 2023-05-18

## 2023-05-18 RX ORDER — ONDANSETRON 4 MG/1
4 TABLET, ORALLY DISINTEGRATING ORAL EVERY 8 HOURS PRN
Qty: 15 TABLET | Refills: 0 | Status: SHIPPED | OUTPATIENT
Start: 2023-05-18 | End: 2023-11-11 | Stop reason: CLARIF

## 2023-05-18 RX ORDER — ROCURONIUM BROMIDE 10 MG/ML
INJECTION, SOLUTION INTRAVENOUS
Status: DISCONTINUED | OUTPATIENT
Start: 2023-05-18 | End: 2023-05-18

## 2023-05-18 RX ORDER — DEXAMETHASONE SODIUM PHOSPHATE 4 MG/ML
INJECTION, SOLUTION INTRA-ARTICULAR; INTRALESIONAL; INTRAMUSCULAR; INTRAVENOUS; SOFT TISSUE
Status: DISCONTINUED | OUTPATIENT
Start: 2023-05-18 | End: 2023-05-18

## 2023-05-18 RX ORDER — OXYCODONE AND ACETAMINOPHEN 5; 325 MG/1; MG/1
1 TABLET ORAL EVERY 4 HOURS PRN
Status: DISCONTINUED | OUTPATIENT
Start: 2023-05-18 | End: 2023-05-18 | Stop reason: HOSPADM

## 2023-05-18 RX ORDER — ONDANSETRON 2 MG/ML
4 INJECTION INTRAMUSCULAR; INTRAVENOUS ONCE
Status: COMPLETED | OUTPATIENT
Start: 2023-05-18 | End: 2023-05-18

## 2023-05-18 RX ORDER — MEPERIDINE HYDROCHLORIDE 50 MG/ML
12.5 INJECTION INTRAMUSCULAR; INTRAVENOUS; SUBCUTANEOUS ONCE
Status: DISCONTINUED | OUTPATIENT
Start: 2023-05-18 | End: 2023-05-18 | Stop reason: HOSPADM

## 2023-05-18 RX ORDER — HYDROMORPHONE HYDROCHLORIDE 2 MG/ML
0.2 INJECTION, SOLUTION INTRAMUSCULAR; INTRAVENOUS; SUBCUTANEOUS EVERY 5 MIN PRN
Status: DISCONTINUED | OUTPATIENT
Start: 2023-05-18 | End: 2023-05-18 | Stop reason: HOSPADM

## 2023-05-18 RX ORDER — ONDANSETRON HYDROCHLORIDE 2 MG/ML
INJECTION, SOLUTION INTRAMUSCULAR; INTRAVENOUS
Status: DISCONTINUED | OUTPATIENT
Start: 2023-05-18 | End: 2023-05-18

## 2023-05-18 RX ORDER — BUPIVACAINE HYDROCHLORIDE AND EPINEPHRINE 5; 5 MG/ML; UG/ML
INJECTION, SOLUTION EPIDURAL; INTRACAUDAL; PERINEURAL
Status: DISCONTINUED | OUTPATIENT
Start: 2023-05-18 | End: 2023-05-18 | Stop reason: HOSPADM

## 2023-05-18 RX ORDER — PROPOFOL 10 MG/ML
VIAL (ML) INTRAVENOUS
Status: DISCONTINUED | OUTPATIENT
Start: 2023-05-18 | End: 2023-05-18

## 2023-05-18 RX ORDER — OXYCODONE AND ACETAMINOPHEN 5; 325 MG/1; MG/1
2 TABLET ORAL EVERY 4 HOURS PRN
Status: DISCONTINUED | OUTPATIENT
Start: 2023-05-18 | End: 2023-05-18 | Stop reason: HOSPADM

## 2023-05-18 RX ORDER — LIDOCAINE HYDROCHLORIDE 20 MG/ML
INJECTION INTRAVENOUS
Status: DISCONTINUED | OUTPATIENT
Start: 2023-05-18 | End: 2023-05-18

## 2023-05-18 RX ORDER — DIPHENHYDRAMINE HYDROCHLORIDE 50 MG/ML
12.5 INJECTION INTRAMUSCULAR; INTRAVENOUS EVERY 6 HOURS PRN
Status: DISCONTINUED | OUTPATIENT
Start: 2023-05-18 | End: 2023-05-18 | Stop reason: HOSPADM

## 2023-05-18 RX ORDER — SUCCINYLCHOLINE CHLORIDE 20 MG/ML
INJECTION INTRAMUSCULAR; INTRAVENOUS
Status: DISCONTINUED | OUTPATIENT
Start: 2023-05-18 | End: 2023-05-18

## 2023-05-18 RX ORDER — OXYCODONE HYDROCHLORIDE 5 MG/1
5 TABLET ORAL
Status: DISCONTINUED | OUTPATIENT
Start: 2023-05-18 | End: 2023-05-18 | Stop reason: HOSPADM

## 2023-05-18 RX ORDER — MIDAZOLAM HYDROCHLORIDE 1 MG/ML
INJECTION INTRAMUSCULAR; INTRAVENOUS
Status: DISCONTINUED | OUTPATIENT
Start: 2023-05-18 | End: 2023-05-18

## 2023-05-18 RX ORDER — OXYCODONE AND ACETAMINOPHEN 5; 325 MG/1; MG/1
1 TABLET ORAL EVERY 4 HOURS PRN
Qty: 12 TABLET | Refills: 0 | Status: SHIPPED | OUTPATIENT
Start: 2023-05-18 | End: 2023-05-18 | Stop reason: SDUPTHER

## 2023-05-18 RX ORDER — FENTANYL CITRATE 50 UG/ML
INJECTION, SOLUTION INTRAMUSCULAR; INTRAVENOUS
Status: DISCONTINUED | OUTPATIENT
Start: 2023-05-18 | End: 2023-05-18

## 2023-05-18 RX ORDER — HYDRALAZINE HYDROCHLORIDE 20 MG/ML
5 INJECTION INTRAMUSCULAR; INTRAVENOUS ONCE
Status: COMPLETED | OUTPATIENT
Start: 2023-05-18 | End: 2023-05-18

## 2023-05-18 RX ORDER — OXYCODONE AND ACETAMINOPHEN 5; 325 MG/1; MG/1
1 TABLET ORAL EVERY 4 HOURS PRN
Qty: 12 TABLET | Refills: 0 | Status: SHIPPED | OUTPATIENT
Start: 2023-05-18 | End: 2023-11-11 | Stop reason: CLARIF

## 2023-05-18 RX ORDER — NEOSTIGMINE METHYLSULFATE 1 MG/ML
INJECTION, SOLUTION INTRAVENOUS
Status: DISCONTINUED | OUTPATIENT
Start: 2023-05-18 | End: 2023-05-18

## 2023-05-18 RX ADMIN — FENTANYL CITRATE 100 MCG: 50 INJECTION, SOLUTION INTRAMUSCULAR; INTRAVENOUS at 07:05

## 2023-05-18 RX ADMIN — NEOSTIGMINE METHYLSULFATE 5 MG: 1 INJECTION INTRAVENOUS at 07:05

## 2023-05-18 RX ADMIN — ROCURONIUM BROMIDE 5 MG: 10 INJECTION, SOLUTION INTRAVENOUS at 07:05

## 2023-05-18 RX ADMIN — MIDAZOLAM HYDROCHLORIDE 2 MG: 1 INJECTION, SOLUTION INTRAMUSCULAR; INTRAVENOUS at 06:05

## 2023-05-18 RX ADMIN — HYDROMORPHONE HYDROCHLORIDE 0.2 MG: 2 INJECTION INTRAMUSCULAR; INTRAVENOUS; SUBCUTANEOUS at 10:05

## 2023-05-18 RX ADMIN — LIDOCAINE HYDROCHLORIDE 75 MG: 20 INJECTION, SOLUTION INTRAVENOUS at 07:05

## 2023-05-18 RX ADMIN — DEXAMETHASONE SODIUM PHOSPHATE 8 MG: 4 INJECTION, SOLUTION INTRA-ARTICULAR; INTRALESIONAL; INTRAMUSCULAR; INTRAVENOUS; SOFT TISSUE at 07:05

## 2023-05-18 RX ADMIN — ONDANSETRON 4 MG: 2 INJECTION INTRAMUSCULAR; INTRAVENOUS at 09:05

## 2023-05-18 RX ADMIN — LABETALOL HYDROCHLORIDE 5 MG: 5 INJECTION, SOLUTION INTRAVENOUS at 07:05

## 2023-05-18 RX ADMIN — HYDROMORPHONE HYDROCHLORIDE 0.2 MG: 2 INJECTION INTRAMUSCULAR; INTRAVENOUS; SUBCUTANEOUS at 09:05

## 2023-05-18 RX ADMIN — ROCURONIUM BROMIDE 25 MG: 10 INJECTION, SOLUTION INTRAVENOUS at 07:05

## 2023-05-18 RX ADMIN — FENTANYL CITRATE 50 MCG: 50 INJECTION, SOLUTION INTRAMUSCULAR; INTRAVENOUS at 07:05

## 2023-05-18 RX ADMIN — FENTANYL CITRATE 25 MCG: 50 INJECTION, SOLUTION INTRAMUSCULAR; INTRAVENOUS at 09:05

## 2023-05-18 RX ADMIN — PROCHLORPERAZINE EDISYLATE 5 MG: 5 INJECTION INTRAMUSCULAR; INTRAVENOUS at 01:05

## 2023-05-18 RX ADMIN — FOLIC ACID 1 MG: 1 TABLET ORAL at 11:05

## 2023-05-18 RX ADMIN — PIPERACILLIN AND TAZOBACTAM 4.5 G: 4; .5 INJECTION, POWDER, LYOPHILIZED, FOR SOLUTION INTRAVENOUS; PARENTERAL at 12:05

## 2023-05-18 RX ADMIN — HYDRALAZINE HYDROCHLORIDE 5 MG: 20 INJECTION INTRAMUSCULAR; INTRAVENOUS at 10:05

## 2023-05-18 RX ADMIN — OXYCODONE HYDROCHLORIDE 5 MG: 5 TABLET ORAL at 08:05

## 2023-05-18 RX ADMIN — SODIUM CHLORIDE, SODIUM GLUCONATE, SODIUM ACETATE, POTASSIUM CHLORIDE, MAGNESIUM CHLORIDE, SODIUM PHOSPHATE, DIBASIC, AND POTASSIUM PHOSPHATE: .53; .5; .37; .037; .03; .012; .00082 INJECTION, SOLUTION INTRAVENOUS at 06:05

## 2023-05-18 RX ADMIN — GLYCOPYRROLATE 0.4 MG: 0.2 INJECTION, SOLUTION INTRAMUSCULAR; INTRAVENOUS at 07:05

## 2023-05-18 RX ADMIN — PIPERACILLIN AND TAZOBACTAM 4.5 G: 4; .5 INJECTION, POWDER, LYOPHILIZED, FOR SOLUTION INTRAVENOUS; PARENTERAL at 07:05

## 2023-05-18 RX ADMIN — FENTANYL CITRATE 25 MCG: 50 INJECTION, SOLUTION INTRAMUSCULAR; INTRAVENOUS at 08:05

## 2023-05-18 RX ADMIN — ACETAMINOPHEN 1000 MG: 10 INJECTION, SOLUTION INTRAVENOUS at 07:05

## 2023-05-18 RX ADMIN — PROPOFOL 200 MG: 10 INJECTION, EMULSION INTRAVENOUS at 07:05

## 2023-05-18 RX ADMIN — DOCUSATE SODIUM AND SENNOSIDES 1 TABLET: 8.6; 5 TABLET, FILM COATED ORAL at 11:05

## 2023-05-18 RX ADMIN — SUCCINYLCHOLINE CHLORIDE 120 MG: 20 INJECTION, SOLUTION INTRAMUSCULAR; INTRAVENOUS at 07:05

## 2023-05-18 RX ADMIN — ONDANSETRON 4 MG: 2 INJECTION INTRAMUSCULAR; INTRAVENOUS at 07:05

## 2023-05-18 NOTE — HOSPITAL COURSE
Admitted with abdominal pain and right lower back pain found to have acute cholecystitis evidenced by lab work and CT imaging. Given IVFH, IV abx, pain meds, and antiemetics. General surgery consulted and performed cholecystectomy without complication. She tolerated adequate amount of diet for discharge. Scripts sent for pain meds and antiemetics. She is to follow up with general surgery clinic. Patient seen and examined on day of discharge.    Physical exam on day of discharge:  General - Patient alert and oriented x3 in NAD  CV - Regular rate and rhythm, No Murmur/zeyad/rubs  Resp - Lungs CTA Bilaterally, No increased WOB  GI - BS normoactive, soft, appropriate post-op tenderness, 4 lap sites well-appearing with surgical glue  Extrem-  No cyanosis, clubbing, edema.   Skin -  No masses, rashes or lesions noted on cursory skin exam.

## 2023-05-18 NOTE — ANESTHESIA PROCEDURE NOTES
Intubation    Date/Time: 5/18/2023 7:07 AM  Performed by: Gonzalo Ruth CRNA  Authorized by: Billy Masters MD     Intubation:     Induction:  Intravenous    Intubated:  Postinduction    Mask Ventilation:  Easy mask    Attempts:  1    Attempted By:  Student    Method of Intubation:  Video laryngoscopy    Blade:  Howell 3    Laryngeal View Grade: Grade I - full view of cords      Difficult Airway Encountered?: No      Complications:  None    Airway Device:  Oral endotracheal tube    Airway Device Size:  7.5    Style/Cuff Inflation:  Cuffed (inflated to minimal occlusive pressure)    Tube secured:  22    Secured at:  The lips    Placement Verified By:  Capnometry    Complicating Factors:  None    Findings Post-Intubation:  BS equal bilateral

## 2023-05-18 NOTE — DISCHARGE SUMMARY
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  Patient states pain feels similar to that of her most recent sickle cell crisis on 01/14/2023.  Patient is followed by , Hem/Onc.  ED workup revealed WBC 16.6, hemoglobin 10, hematocrit 28.7, retic 4.2, UA negative for UTI, lipase 31 and , ALT 83 and T bili 1.3.  CT abdomen demonstrated distended gallbladder thickening with possible cholecystitis.  Patient received IV fluids and IV pain medication in the ED. ED provider spoke to General surgery and Hospital Medicine was consulted.  Patient seen in the ED and reports pain controlled with IV morphine.  She denies recent nausea and vomiting.  Patient also denies chest pain, shortness of breath, fever, chills, diarrhea and headaches.  Patient will be admitted to Hospital Medicine for further evaluation management.               Procedure(s) (LRB):  ROBOTIC CHOLECYSTECTOMY (N/A)      Hospital Course:   Admitted with abdominal pain and right lower back pain found to have acute cholecystitis evidenced by lab work and CT imaging. Given IVFH, IV abx, pain meds, and antiemetics. General surgery consulted and performed cholecystectomy without complication. She tolerated adequate amount of diet for discharge. Scripts sent for pain meds and  antiemetics. She is to follow up with general surgery clinic. Patient seen and examined on day of discharge.    Physical exam on day of discharge:  General - Patient alert and oriented x3 in NAD  CV - Regular rate and rhythm, No Murmur/zeyad/rubs  Resp - Lungs CTA Bilaterally, No increased WOB  GI - BS normoactive, soft, appropriate post-op tenderness, 4 lap sites well-appearing with surgical glue  Extrem-  No cyanosis, clubbing, edema.   Skin -  No masses, rashes or lesions noted on cursory skin exam.         Goals of Care Treatment Preferences:  Code Status: Full Code      Consults:   Consults (From admission, onward)        Status Ordering Provider     Inpatient consult to General Surgery  Once        Provider:  Maik Escobedo MD    Completed MALIHA CRAFT          No new Assessment & Plan notes have been filed under this hospital service since the last note was generated.  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 Schedule an appointment as soon as possible for a visit in 2 week(s).    Specialty: General Surgery  Why: Please call the office to schedule a hospital follow up.  Contact information:  [address]  [address]  [place] [address]  [phone]                       Patient Instructions:      Diet Adult Regular     Notify your health care provider if you experience any of the following:  temperature >100.4     Notify your health care provider if you experience any of the following:  persistent nausea and vomiting or diarrhea     Notify your health care provider if you experience any of the following:  severe uncontrolled pain     Notify your health care provider  if you experience any of the following:  redness, tenderness, or signs of infection (pain, swelling, redness, odor or green/yellow discharge around incision site)     Notify your health care provider if you experience any of the following:  difficulty breathing or increased cough     Notify your health care provider if you experience any of the following:  severe persistent headache     Notify your health care provider if you experience any of the following:  worsening rash     Notify your health care provider if you experience any of the following:  persistent dizziness, light-headedness, or visual disturbances     Notify your health care provider if you experience any of the following:  increased confusion or weakness     Activity as tolerated       Significant Diagnostic Studies:     Admission on 05/17/2023   Component Date Value Ref Range Status    HIV 1/2 Ag/Ab 05/16/2023 Non-reactive  Non-reactive Final    Hepatitis C Ab 05/16/2023 Non-reactive  Non-reactive Final    Retic 05/16/2023 4.2 (H)  0.5 - 2.5 % Final    WBC 05/16/2023 16.66 (H)  3.90 - 12.70 K/uL Final    RBC 05/16/2023 4.45  4.00 - 5.40 M/uL Final    Hemoglobin 05/16/2023 10.0 (L)  12.0 - 16.0 g/dL Final    Hematocrit 05/16/2023 28.7 (L)  37.0 - 48.5 % Final    MCV 05/16/2023 65 (L)  82 - 98 fL Final    MCH 05/16/2023 22.5 (L)  27.0 - 31.0 pg Final    MCHC 05/16/2023 34.8  32.0 - 36.0 g/dL Final    RDW 05/16/2023 17.8 (H)  11.5 - 14.5 % Final    Platelets 05/16/2023 464 (H)  150 - 450 K/uL Final    MPV 05/16/2023 9.2  9.2 - 12.9 fL Final    Immature Granulocytes 05/16/2023 0.4  0.0 - 0.5 % Final    Gran # (ANC) 05/16/2023 13.2 (H)  1.8 - 7.7 K/uL Final    Immature Grans (Abs) 05/16/2023 0.06 (H)  0.00 - 0.04 K/uL Final    Comment: Mild elevation in immature granulocytes is non specific and   can be seen in a variety of conditions including stress response,   acute inflammation, trauma and pregnancy. Correlation with other    laboratory and clinical findings is essential.      Lymph # 05/16/2023 2.2  1.0 - 4.8 K/uL Final    Mono # 05/16/2023 1.0  0.3 - 1.0 K/uL Final    Eos # 05/16/2023 0.0  0.0 - 0.5 K/uL Final    Baso # 05/16/2023 0.10  0.00 - 0.20 K/uL Final    nRBC 05/16/2023 2 (A)  0 /100 WBC Final    Gran % 05/16/2023 79.3 (H)  38.0 - 73.0 % Final    Lymph % 05/16/2023 13.3 (L)  18.0 - 48.0 % Final    Mono % 05/16/2023 6.2  4.0 - 15.0 % Final    Eosinophil % 05/16/2023 0.2  0.0 - 8.0 % Final    Basophil % 05/16/2023 0.6  0.0 - 1.9 % Final    Differential Method 05/16/2023 Automated   Final    Sodium 05/16/2023 141  136 - 145 mmol/L Final    Potassium 05/16/2023 4.0  3.5 - 5.1 mmol/L Final    Chloride 05/16/2023 109  95 - 110 mmol/L Final    CO2 05/16/2023 21 (L)  23 - 29 mmol/L Final    Glucose 05/16/2023 106  70 - 110 mg/dL Final    BUN 05/16/2023 6  6 - 20 mg/dL Final    Creatinine 05/16/2023 0.9  0.5 - 1.4 mg/dL Final    Calcium 05/16/2023 9.6  8.7 - 10.5 mg/dL Final    Total Protein 05/16/2023 7.6  6.0 - 8.4 g/dL Final    Albumin 05/16/2023 4.3  3.5 - 5.2 g/dL Final    Total Bilirubin 05/16/2023 1.3 (H)  0.1 - 1.0 mg/dL Final    Comment: For infants and newborns, interpretation of results should be based  on gestational age, weight and in agreement with clinical  observations.    Premature Infant recommended reference ranges:  Up to 24 hours.............<8.0 mg/dL  Up to 48 hours............<12.0 mg/dL  3-5 days..................<15.0 mg/dL  6-29 days.................<15.0 mg/dL      Alkaline Phosphatase 05/16/2023 95  55 - 135 U/L Final    AST 05/16/2023 115 (H)  10 - 40 U/L Final    ALT 05/16/2023 83 (H)  10 - 44 U/L Final    Anion Gap 05/16/2023 11  8 - 16 mmol/L Final    eGFR 05/16/2023 >60  >60 mL/min/1.73 m^2 Final    Specimen UA 05/17/2023 Urine, Clean Catch   Final    Color, UA 05/17/2023 Yellow  Yellow, Straw, Grace Final    Appearance, UA 05/17/2023 Clear  Clear Final    pH, UA  05/17/2023 6.0  5.0 - 8.0 Final    Specific Gravity, UA 05/17/2023 1.015  1.005 - 1.030 Final    Protein, UA 05/17/2023 Negative  Negative Final    Comment: Recommend a 24 hour urine protein or a urine   protein/creatinine ratio if globulin induced proteinuria is  clinically suspected.      Glucose, UA 05/17/2023 Negative  Negative Final    Ketones, UA 05/17/2023 Negative  Negative Final    Bilirubin (UA) 05/17/2023 Negative  Negative Final    Occult Blood UA 05/17/2023 Negative  Negative Final    Nitrite, UA 05/17/2023 Negative  Negative Final    Urobilinogen, UA 05/17/2023 Negative  <2.0 EU/dL Final    Leukocytes, UA 05/17/2023 Negative  Negative Final    Lipase 05/16/2023 31  4 - 60 U/L Final    Preg Test, Ur 05/17/2023 Negative   Final    Sodium 05/17/2023 143  136 - 145 mmol/L Final    Potassium 05/17/2023 4.1  3.5 - 5.1 mmol/L Final    Chloride 05/17/2023 111 (H)  95 - 110 mmol/L Final    CO2 05/17/2023 24  23 - 29 mmol/L Final    Glucose 05/17/2023 110  70 - 110 mg/dL Final    BUN 05/17/2023 5 (L)  6 - 20 mg/dL Final    Creatinine 05/17/2023 0.9  0.5 - 1.4 mg/dL Final    Calcium 05/17/2023 9.5  8.7 - 10.5 mg/dL Final    Total Protein 05/17/2023 6.9  6.0 - 8.4 g/dL Final    Albumin 05/17/2023 3.9  3.5 - 5.2 g/dL Final    Total Bilirubin 05/17/2023 1.1 (H)  0.1 - 1.0 mg/dL Final    Comment: For infants and newborns, interpretation of results should be based  on gestational age, weight and in agreement with clinical  observations.    Premature Infant recommended reference ranges:  Up to 24 hours.............<8.0 mg/dL  Up to 48 hours............<12.0 mg/dL  3-5 days..................<15.0 mg/dL  6-29 days.................<15.0 mg/dL      Alkaline Phosphatase 05/17/2023 88  55 - 135 U/L Final    AST 05/17/2023 59 (H)  10 - 40 U/L Final    ALT 05/17/2023 66 (H)  10 - 44 U/L Final    Anion Gap 05/17/2023 8  8 - 16 mmol/L Final    eGFR 05/17/2023 >60  >60 mL/min/1.73 m^2 Final    WBC  05/17/2023 14.32 (H)  3.90 - 12.70 K/uL Final    RBC 05/17/2023 4.39  4.00 - 5.40 M/uL Final    Hemoglobin 05/17/2023 9.6 (L)  12.0 - 16.0 g/dL Final    Hematocrit 05/17/2023 28.1 (L)  37.0 - 48.5 % Final    MCV 05/17/2023 64 (L)  82 - 98 fL Final    MCH 05/17/2023 21.9 (L)  27.0 - 31.0 pg Final    MCHC 05/17/2023 34.2  32.0 - 36.0 g/dL Final    RDW 05/17/2023 17.5 (H)  11.5 - 14.5 % Final    Platelets 05/17/2023 461 (H)  150 - 450 K/uL Final    MPV 05/17/2023 8.9 (L)  9.2 - 12.9 fL Final    Immature Granulocytes 05/17/2023 0.1  0.0 - 0.5 % Final    Gran # (ANC) 05/17/2023 9.6 (H)  1.8 - 7.7 K/uL Final    Immature Grans (Abs) 05/17/2023 0.02  0.00 - 0.04 K/uL Final    Comment: Mild elevation in immature granulocytes is non specific and   can be seen in a variety of conditions including stress response,   acute inflammation, trauma and pregnancy. Correlation with other   laboratory and clinical findings is essential.      Lymph # 05/17/2023 3.6  1.0 - 4.8 K/uL Final    Mono # 05/17/2023 0.9  0.3 - 1.0 K/uL Final    Eos # 05/17/2023 0.2  0.0 - 0.5 K/uL Final    Baso # 05/17/2023 0.10  0.00 - 0.20 K/uL Final    nRBC 05/17/2023 1 (A)  0 /100 WBC Final    Gran % 05/17/2023 66.7  38.0 - 73.0 % Final    Lymph % 05/17/2023 25.3  18.0 - 48.0 % Final    Mono % 05/17/2023 5.9  4.0 - 15.0 % Final    Eosinophil % 05/17/2023 1.3  0.0 - 8.0 % Final    Basophil % 05/17/2023 0.7  0.0 - 1.9 % Final    Differential Method 05/17/2023 Automated   Final    Sodium 05/18/2023 142  136 - 145 mmol/L Final    Potassium 05/18/2023 3.9  3.5 - 5.1 mmol/L Final    Chloride 05/18/2023 107  95 - 110 mmol/L Final    CO2 05/18/2023 24  23 - 29 mmol/L Final    Glucose 05/18/2023 80  70 - 110 mg/dL Final    BUN 05/18/2023 5 (L)  6 - 20 mg/dL Final    Creatinine 05/18/2023 1.2  0.5 - 1.4 mg/dL Final    Calcium 05/18/2023 9.8  8.7 - 10.5 mg/dL Final    Total Protein 05/18/2023 7.1  6.0 - 8.4 g/dL Final    Albumin  05/18/2023 4.0  3.5 - 5.2 g/dL Final    Total Bilirubin 05/18/2023 1.3 (H)  0.1 - 1.0 mg/dL Final    Comment: For infants and newborns, interpretation of results should be based  on gestational age, weight and in agreement with clinical  observations.    Premature Infant recommended reference ranges:  Up to 24 hours.............<8.0 mg/dL  Up to 48 hours............<12.0 mg/dL  3-5 days..................<15.0 mg/dL  6-29 days.................<15.0 mg/dL      Alkaline Phosphatase 05/18/2023 86  55 - 135 U/L Final    AST 05/18/2023 36  10 - 40 U/L Final    ALT 05/18/2023 55 (H)  10 - 44 U/L Final    Anion Gap 05/18/2023 11  8 - 16 mmol/L Final    eGFR 05/18/2023 59 (A)  >60 mL/min/1.73 m^2 Final    WBC 05/18/2023 10.58  3.90 - 12.70 K/uL Final    RBC 05/18/2023 4.38  4.00 - 5.40 M/uL Final    Hemoglobin 05/18/2023 9.6 (L)  12.0 - 16.0 g/dL Final    Hematocrit 05/18/2023 28.5 (L)  37.0 - 48.5 % Final    MCV 05/18/2023 65 (L)  82 - 98 fL Final    MCH 05/18/2023 21.9 (L)  27.0 - 31.0 pg Final    MCHC 05/18/2023 33.7  32.0 - 36.0 g/dL Final    RDW 05/18/2023 18.1 (H)  11.5 - 14.5 % Final    Platelets 05/18/2023 440  150 - 450 K/uL Final    MPV 05/18/2023 9.5  9.2 - 12.9 fL Final    Immature Granulocytes 05/18/2023 0.4  0.0 - 0.5 % Final    Gran # (ANC) 05/18/2023 6.6  1.8 - 7.7 K/uL Final    Immature Grans (Abs) 05/18/2023 0.04  0.00 - 0.04 K/uL Final    Comment: Mild elevation in immature granulocytes is non specific and   can be seen in a variety of conditions including stress response,   acute inflammation, trauma and pregnancy. Correlation with other   laboratory and clinical findings is essential.      Lymph # 05/18/2023 2.8  1.0 - 4.8 K/uL Final    Mono # 05/18/2023 0.8  0.3 - 1.0 K/uL Final    Eos # 05/18/2023 0.4  0.0 - 0.5 K/uL Final    Baso # 05/18/2023 0.06  0.00 - 0.20 K/uL Final    nRBC 05/18/2023 2 (A)  0 /100 WBC Final    Gran % 05/18/2023 61.9  38.0 - 73.0 % Final    Lymph %  05/18/2023 26.2  18.0 - 48.0 % Final    Mono % 05/18/2023 7.3  4.0 - 15.0 % Final    Eosinophil % 05/18/2023 3.6  0.0 - 8.0 % Final    Basophil % 05/18/2023 0.6  0.0 - 1.9 % Final    Differential Method 05/18/2023 Automated   Final         Imaging Results          CT Abdomen Pelvis With Contrast (Final result)  Result time 05/17/23 08:14:39    Final result by Frandy Thrasher MD (05/17/23 08:14:39)                 Impression:      Features compatible with acute cholecystitis in the appropriate clinical setting.    Multifocal osteonecrosis.  Splenic auto-infarction.  The findings are in keeping with patient's underlying history of sickle cell disease.      Electronically signed by: Frandy Thrasher MD  Date:    05/17/2023  Time:    08:14             Narrative:    EXAMINATION:  CT ABDOMEN PELVIS WITH CONTRAST    CLINICAL HISTORY:  Abdominal pain, acute, nonlocalized;RUQ abd pain;    TECHNIQUE:  Low dose axial images, sagittal and coronal reformations were obtained from the lung bases to the pubic symphysis following the IV administration of 75 mL of Omnipaque 350 .  Oral contrast was not given.    COMPARISON:  03/19/2023    FINDINGS:  The gallbladder is dilated and exhibits moderate mural edema and pericholecystic fluid.  The common bile duct is moderately dilated at 10 mm.  There is mild intrahepatic biliary dilatation.  There is no radiopaque cholelithiasis or choledocholithiasis.    The liver is normal in size and contour without focal lesion.  The pancreas is unremarkable, without ductal dilatation or pancreatic head mass.  The spleen is small and calcified.  The adrenal glands are normal.  The kidneys are unremarkable.    There is a small fat containing umbilical hernia.  The small bowel and colon are unremarkable.  The appendix is not seen.    There are sclerotic changes of a the sacrum, iliac bones, and femoral heads as well as multiple lower lumbar vertebral bodies, compatible with osteonecrosis.   This is most advanced involving the left femoral head, where there is fragmentation of the femoral head with intra-articular body formation within the left hip joint measuring up to 2.7 cm.                                  Pending Diagnostic Studies:     Procedure Component Value Units Date/Time    Specimen to Pathology, Surgery General Surgery [[id number]] Collected: 05/18/23 0741    Order Status: Sent Lab Status: In process Updated: 05/18/23 0923    Specimen: Tissue          Medications:  Reconciled Home Medications:      Medication List      START taking these medications    ondansetron 4 MG Tbdl  Commonly known as: ZOFRAN-ODT  Take 1 tablet (4 mg total) by mouth every 8 (eight) hours as needed.     oxyCODONE-acetaminophen 5-325 mg per tablet  Commonly known as: PERCOCET  Take 1 tablet by mouth every 4 (four) hours as needed for Pain.        CONTINUE taking these medications    cetirizine 10 MG tablet  Commonly known as: ZYRTEC  Take 10 mg by mouth once daily.     ferrous gluconate 324 MG tablet  Commonly known as: FERGON  TK 1 T PO BID     * fluticasone propionate 50 mcg/actuation nasal spray  Commonly known as: FLONASE  1 spray by Each Nare route once daily.     * fluticasone propionate 110 mcg/actuation inhaler  Commonly known as: FLOVENT HFA  Inhale 1 puff into the lungs 2 (two) times daily. Controller     folic acid 1 MG tablet  Commonly known as: FOLVITE  Take 1 mg by mouth once daily.     ondansetron 8 MG tablet  Commonly known as: ZOFRAN  Take 1 tablet (8 mg total) by mouth every 12 (twelve) hours as needed for Nausea.     pantoprazole 40 MG tablet  Commonly known as: PROTONIX  Take 1 tablet (40 mg total) by mouth once daily.         * This list has 2 medication(s) that are the same as other medications prescribed for you. Read the directions carefully, and ask your doctor or other care provider to review them with you.                Indwelling Lines/Drains at time of discharge:   Lines/Drains/Airways      None                 Time spent on the discharge of patient: 35 minutes         Az Jurado MD  Department of Hospital Medicine  [facility]

## 2023-05-18 NOTE — PLAN OF CARE
Patient transferred from room 310 to Pre op room 520 . Oriented to pre op area. Spouse not here at this time, set up on auto text. Negative pregnancy test on 5/17/23.

## 2023-05-18 NOTE — PLAN OF CARE
Plan of care reviewed with pt. Pt verbalized understanding. Patient is alert and oriented x 4, able to make needs known. A-febrile throughout the shift. ABX administered as scheduled. Meds given per MAR. IVF infusing as ordered. Ambulated to [facility] with x2 assist. Repositions self independently. Complaints of pain and discomfort, PRN medication given. Purposeful hourly/q2hr rounding done during shift to promote patient safety. NAD noted. Safety maintained with side rails up x3, bed wheels locked, bed in lowest position, call light in reach. Patient educated to call for assistance with ambulation if needed, verbalized understanding. Pt remains free of falls. No further needs expressed at this time. Will continue to monitor.     Problem: Adult Inpatient Plan of Care  Goal: Plan of Care Review  Outcome: Ongoing, Progressing  Goal: Patient-Specific Goal (Individualized)  Outcome: Ongoing, Progressing  Goal: Absence of Hospital-Acquired Illness or Injury  Outcome: Ongoing, Progressing  Goal: Optimal Comfort and Wellbeing  Outcome: Ongoing, Progressing  Goal: Readiness for Transition of Care  Outcome: Ongoing, Progressing     Problem: Infection  Goal: Absence of Infection Signs and Symptoms  Outcome: Ongoing, Progressing

## 2023-05-18 NOTE — TRANSFER OF CARE
"Anesthesia Transfer of Care Note    Patient: Shi Saba    Procedure(s) Performed: Procedure(s) (LRB):  ROBOTIC CHOLECYSTECTOMY (N/A)    Patient location: PACU    Anesthesia Type: general    Transport from OR: Transported from OR on 2-3 L/min O2 by NC with adequate spontaneous ventilation    Post pain: adequate analgesia    Post assessment: tolerated procedure well and no apparent anesthetic complications    Post vital signs: stable    Level of consciousness: sedated    Nausea/Vomiting: no nausea/vomiting    Complications: none    Transfer of care protocol was followed      Last vitals:   Visit Vitals  BP (!) 152/98 (BP Location: Left arm, Patient Position: Lying)   Pulse (!) 59   Temp 36.9 °C (98.5 °F) (Oral)   Resp 18   Ht 5' 2" (1.575 m)   Wt 64.4 kg (142 lb)   SpO2 98%   Breastfeeding Yes   BMI 25.97 kg/m²     "

## 2023-05-18 NOTE — ANESTHESIA PREPROCEDURE EVALUATION
05/18/2023  Shi Saba is a 38 y.o., female.      Pre-op Assessment    I have reviewed the Patient Summary Reports.     I have reviewed the Nursing Notes. I have reviewed the NPO Status.   I have reviewed the Medications.     Review of Systems  Hematology/Oncology:         -- Anemia: Hematology Comments: H/H 9/28 - sickle cell dz   Cardiovascular:  Cardiovascular Normal     Pulmonary:  Pulmonary Normal    Renal/:  Renal/ Normal     Hepatic/GI:   naomie   Neurological:  Neurology Normal    Endocrine:  Endocrine Normal        Physical Exam  General: Well nourished    Airway:  Mallampati: II   Neck ROM: Normal ROM    Dental:  Intact    Chest/Lungs:  Clear to auscultation, Normal Respiratory Rate    Heart:  Rate: Normal  Rhythm: Regular Rhythm        Anesthesia Plan  Type of Anesthesia, risks & benefits discussed:    Anesthesia Type: Gen ETT  Intra-op Monitoring Plan: Standard ASA Monitors  Post Op Pain Control Plan: multimodal analgesia and IV/PO Opioids PRN  Induction:  IV and Inhalation  Informed Consent: Informed consent signed with the Patient and all parties understand the risks and agree with anesthesia plan.  All questions answered.   ASA Score: 2    Ready For Surgery From Anesthesia Perspective.     .

## 2023-05-18 NOTE — PLAN OF CARE
The pt is cleared for discharge home from case management.    05/18/23 1051   Final Note   Assessment Type Final Discharge Note   Anticipated Discharge Disposition Home   What phone number can be called within the next 1-3 days to see how you are doing after discharge? [phone]   Post-Acute Status   Discharge Delays None known at this time

## 2023-05-18 NOTE — NURSING
Nurses Note -- 4 Eyes      5/17/2023   10:00 AM      Skin assessed during: Admit      [x] No Altered Skin Integrity Present    []Prevention Measures Documented      [] Yes- Altered Skin Integrity Present or Discovered   [] LDA Added if Not in Epic (Describe Wound)   [] New Altered Skin Integrity was Present on Admit and Documented in LDA   [] Wound Image Taken    Wound Care Consulted? No    Attending Nurse:  Radha Chavez RN     Second RN/Staff Member:  Elvis Saunders LPN

## 2023-05-18 NOTE — PLAN OF CARE
Patient transferred to room from ED. Oriented to room. Plan of care reviewed with patient, verbalized understanding. Denies pain. NPO. General surgery consulted. Bed in lowest position and call light within reach.

## 2023-05-18 NOTE — PLAN OF CARE
Problem: Adult Inpatient Plan of Care  Goal: Plan of Care Review  Outcome: Met  Goal: Patient-Specific Goal (Individualized)  Outcome: Met  Goal: Absence of Hospital-Acquired Illness or Injury  Outcome: Met  Goal: Optimal Comfort and Wellbeing  Outcome: Met  Goal: Readiness for Transition of Care  Outcome: Met     Problem: Infection  Goal: Absence of Infection Signs and Symptoms  Outcome: Met   POC has been reviewed with pt.  Pt had robotic lap cholecystectomy with Dr. Escobedo today.  Pt on clear liquid diet.  Pt tolerated 50% and got nauseated and required compazine IV.  Pt cleared by Dr. Escobedo and hospitalist Dr. Jurado to discharge home today.  Pt adequate for discharge.

## 2023-05-18 NOTE — ANESTHESIA POSTPROCEDURE EVALUATION
Anesthesia Post Evaluation    Patient: Shi Saba    Procedure(s) Performed: Procedure(s) (LRB):  ROBOTIC CHOLECYSTECTOMY (N/A)    Final Anesthesia Type: general      Patient location during evaluation: PACU  Patient participation: Yes- Able to Participate  Level of consciousness: sedated and awake  Post-procedure vital signs: reviewed and stable  Pain management: adequate  Airway patency: patent    PONV status at discharge: No PONV  Anesthetic complications: no      Cardiovascular status: hypertensive and blood pressure returned to baseline  Respiratory status: spontaneous ventilation  Hydration status: euvolemic  Follow-up not needed.          Vitals Value Taken Time   /89 05/18/23 0912   Temp 36.7 °C (98.1 °F) 05/18/23 0825   Pulse 54 05/18/23 0913   Resp 12 05/18/23 0914   SpO2 95 % 05/18/23 0913   Vitals shown include unvalidated device data.      No case tracking events are documented in the log.      Pain/Cheryl Score: Pain Rating Prior to Med Admin: 6 (5/18/2023  9:14 AM)  Pain Rating Post Med Admin: 2 (5/17/2023  8:24 PM)  Cheryl Score: 10 (5/18/2023  9:05 AM)

## 2023-05-18 NOTE — NURSING
Pt discharged to home with  at side.  Pt rolled down with w/c to vehicle.  Pt verbalized understanding of discharge orders.   no c/o pain/nausea/vomitting.  Will CTM.

## 2023-05-18 NOTE — RESPIRATORY THERAPY
05/17/23 1920   Patient Assessment/Suction   Level of Consciousness (AVPU) alert   Respiratory Effort Normal;Unlabored   Expansion/Accessory Muscles/Retractions expansion symmetric;no use of accessory muscles   PRE-TX-O2   Device (Oxygen Therapy) room air   SpO2 96 %   Pulse Oximetry Type Intermittent   $ Pulse Oximetry - Multiple Charge Pulse Oximetry - Multiple   Pulse (!) 59   Resp 18

## 2023-05-18 NOTE — OP NOTE
[facility]  Surgery Department  Operative Note    SUMMARY     Date of Procedure: 5/18/2023     Procedure: Procedure(s) (LRB):  ROBOTIC CHOLECYSTECTOMY (N/A)     Surgeon(s) and Role:     * Maik Escobedo MD - Primary    Assisting Surgeon: Alberta BALLESTEROS    Pre-Operative Diagnosis: Cholecystitis, unspecified [K81.9]    Post-Operative Diagnosis: Post-Op Diagnosis Codes:     * Cholecystitis, unspecified [K81.9]    Anesthesia: General     Operative Findings (including complications, if any):  Cholecystitis    Description of Technical Procedures:  This is a 38-year-old female who presented with right upper quadrant abdominal pain.  Imaging was most consistent with acute calculous cholecystitis.  She did consent operative intervention.  She was taken to the OR, placed supine, general endotracheal anesthesia was induced, the abdomen was prepped and draped in the usual fashion, a time-out was completed.  Access to the abdomen was achieved in the left upper quadrant using Optiview technique.  The abdomen was insufflated to 15 mmHg a scope was inserted.  There was no apparent injury during entry.  Three additional 8 mm robotic trocars were placed across the abdomen under direct visualization and the robot was docked.  The gallbladder was identified.  It was mildly inflamed.  The dome was grasped and retracted cephalad.  Peritoneum was stripped off the base exposing the duct and artery as they entered the gallbladder.  These were clipped and then divided.  The gallbladder was removed off the fossa using electrocautery, placed in an Endo-Catch bag, and removed from the left upper quadrant incision.  The right upper quadrant was then inspected for hemostasis which was adequate.  Trocars removed under direct visualization and insufflation was allowed to escape.  Fascia at the extraction site was closed with an 0 Vicryl suture.  Skin was closed with 4-0 Monocryl.  Dermabond was applied as a dressing.   Patient tolerated the entire procedure without apparent complication, was extubated in the OR, brought to recovery in stable condition.  All counts were correct.    Significant Surgical Tasks Conducted by the Assistant(s), if Applicable:  Bedside assist, skin closure, bandage application.    Estimated Blood Loss (EBL):  Minimal           Implants: * No implants in log *    Specimens:   Specimen (24h ago, onward)       Start     Ordered    05/18/23 0740  Specimen to Pathology, Surgery General Surgery  Once        Comments: Pre-op Diagnosis: Cholecystitis, unspecified [K81.9]Procedure(s):ROBOTIC CHOLECYSTECTOMY Number of specimens: 1Name of specimens: Gallbladder     References:    Click here for ordering Quick Tip   Question Answer Comment   Procedure Type: General Surgery    Specimen Class: Routine/Screening    Which provider would you like to cc? MARIELOS VAZQUEZ    Release to patient Immediate        05/18/23 0749                            Condition: Good    Disposition: PACU - hemodynamically stable.    Attestation: I was present and scrubbed for the entire procedure.

## 2023-05-18 NOTE — PLAN OF CARE
Released per anesthesia, when criteria met. VS WDL, Resp even and unlabored. IS reviewed and pt encouraged to continue independently. Incision sites x 4 to abdomen, pain controlled, no nausea and vomiting, voided clear urine.

## 2023-05-23 LAB
FINAL PATHOLOGIC DIAGNOSIS: NORMAL
GROSS: NORMAL
Lab: NORMAL

## 2023-05-26 ENCOUNTER — TELEPHONE (OUTPATIENT)
Dept: SURGERY | Facility: CLINIC | Age: 38
End: 2023-05-26
Payer: MEDICAID

## 2023-05-26 NOTE — TELEPHONE ENCOUNTER
----- Message from Kalpesh Biswas sent at 5/26/2023 10:15 AM CDT -----  Type:  Sooner Appointment Request    Caller is requesting a sooner appointment.  Caller declined first available appointment listed below.  Caller will not accept being placed on the waitlist and is requesting a message be sent to doctor.    Name of Caller:  Patient  When is the first available appointment?  Out of Template-- EP-Medicaid  Symptoms:  Post Op-- Gall bladder  Best Call Back Number:  [phone]  Additional Information:

## 2023-06-05 ENCOUNTER — OFFICE VISIT (OUTPATIENT)
Dept: SURGERY | Facility: CLINIC | Age: 38
End: 2023-06-05
Payer: MEDICAID

## 2023-06-05 VITALS
DIASTOLIC BLOOD PRESSURE: 86 MMHG | BODY MASS INDEX: 26.41 KG/M2 | SYSTOLIC BLOOD PRESSURE: 142 MMHG | HEIGHT: 62 IN | WEIGHT: 143.5 LBS | HEART RATE: 52 BPM | TEMPERATURE: 99 F | RESPIRATION RATE: 16 BRPM

## 2023-06-05 DIAGNOSIS — Z98.890 POST-OPERATIVE STATE: Primary | ICD-10-CM

## 2023-06-05 PROCEDURE — 99213 OFFICE O/P EST LOW 20 MIN: CPT | Mod: PBBFAC,PN | Performed by: STUDENT IN AN ORGANIZED HEALTH CARE EDUCATION/TRAINING PROGRAM

## 2023-06-05 PROCEDURE — 3079F PR MOST RECENT DIASTOLIC BLOOD PRESSURE 80-89 MM HG: ICD-10-PCS | Mod: CPTII,,, | Performed by: STUDENT IN AN ORGANIZED HEALTH CARE EDUCATION/TRAINING PROGRAM

## 2023-06-05 PROCEDURE — 99999 PR PBB SHADOW E&M-EST. PATIENT-LVL III: ICD-10-PCS | Mod: PBBFAC,,, | Performed by: STUDENT IN AN ORGANIZED HEALTH CARE EDUCATION/TRAINING PROGRAM

## 2023-06-05 PROCEDURE — 99024 PR POST-OP FOLLOW-UP VISIT: ICD-10-PCS | Mod: ,,, | Performed by: STUDENT IN AN ORGANIZED HEALTH CARE EDUCATION/TRAINING PROGRAM

## 2023-06-05 PROCEDURE — 3008F BODY MASS INDEX DOCD: CPT | Mod: CPTII,,, | Performed by: STUDENT IN AN ORGANIZED HEALTH CARE EDUCATION/TRAINING PROGRAM

## 2023-06-05 PROCEDURE — 3008F PR BODY MASS INDEX (BMI) DOCUMENTED: ICD-10-PCS | Mod: CPTII,,, | Performed by: STUDENT IN AN ORGANIZED HEALTH CARE EDUCATION/TRAINING PROGRAM

## 2023-06-05 PROCEDURE — 3077F SYST BP >= 140 MM HG: CPT | Mod: CPTII,,, | Performed by: STUDENT IN AN ORGANIZED HEALTH CARE EDUCATION/TRAINING PROGRAM

## 2023-06-05 PROCEDURE — 3079F DIAST BP 80-89 MM HG: CPT | Mod: CPTII,,, | Performed by: STUDENT IN AN ORGANIZED HEALTH CARE EDUCATION/TRAINING PROGRAM

## 2023-06-05 PROCEDURE — 99999 PR PBB SHADOW E&M-EST. PATIENT-LVL III: CPT | Mod: PBBFAC,,, | Performed by: STUDENT IN AN ORGANIZED HEALTH CARE EDUCATION/TRAINING PROGRAM

## 2023-06-05 PROCEDURE — 1159F PR MEDICATION LIST DOCUMENTED IN MEDICAL RECORD: ICD-10-PCS | Mod: CPTII,,, | Performed by: STUDENT IN AN ORGANIZED HEALTH CARE EDUCATION/TRAINING PROGRAM

## 2023-06-05 PROCEDURE — 1159F MED LIST DOCD IN RCRD: CPT | Mod: CPTII,,, | Performed by: STUDENT IN AN ORGANIZED HEALTH CARE EDUCATION/TRAINING PROGRAM

## 2023-06-05 PROCEDURE — 99024 POSTOP FOLLOW-UP VISIT: CPT | Mod: ,,, | Performed by: STUDENT IN AN ORGANIZED HEALTH CARE EDUCATION/TRAINING PROGRAM

## 2023-06-05 PROCEDURE — 3077F PR MOST RECENT SYSTOLIC BLOOD PRESSURE >= 140 MM HG: ICD-10-PCS | Mod: CPTII,,, | Performed by: STUDENT IN AN ORGANIZED HEALTH CARE EDUCATION/TRAINING PROGRAM

## 2023-06-05 NOTE — PROGRESS NOTES
Postop note     Patient underwent robotic cholecystectomy.  Doing well.  Preop symptoms have abated.      Incisions are well approximated     Pathology:  Benign gallbladder    Overall doing well.  Follow up as needed.

## 2023-06-14 ENCOUNTER — TELEPHONE (OUTPATIENT)
Dept: SURGERY | Facility: CLINIC | Age: 38
End: 2023-06-14
Payer: MEDICAID

## 2023-06-14 NOTE — TELEPHONE ENCOUNTER
Attempted to call back, left message on voice mail to call us back    I spoke with Dr Escobedo and he advised this is common.  Recommend ice, Ibuprofen and rest.  Call as needed.

## 2023-06-14 NOTE — TELEPHONE ENCOUNTER
Spoke with patient, states she is having pain in her right side and it is burning.  Advised it is common around the top incision, recommend ice, Ibuprofen.  She states it is a larger area and the burning pain is significant.  Advised she should come in tomorrow to see Dr Escobedo.  Appointment scheduled at 1 pm 6-.

## 2023-06-14 NOTE — TELEPHONE ENCOUNTER
----- Message from Johana Masters sent at 6/14/2023  1:15 PM CDT -----  Contact: Patient  Type:  Patient Returning Call    Who Called:  Patient  Who Left Message for Patient:  Claudia  Does the patient know what this is regarding?:   patient has pain in side    Would the patient rather a call back or a response via [facility]?   Call back  Best Call Back Number:  [phone]    Additional Information: States she is returning a missed call - states she had surgery about 3-4 weeks ago and is experiencing pain in her side - please call to advise/discuss - thank you

## 2023-06-14 NOTE — TELEPHONE ENCOUNTER
----- Message from Leighann Patrick sent at 6/14/2023  9:07 AM CDT -----  Regarding: Needs Medical Advice  Contact: patient at [phone]  Type: Needs Medical Advice  Who Called:  patient at [phone]    Symptoms (please be specific):  burning feeling by incision/sharp or stretched feeling  How long has patient had these symptoms:  few days    Additional Information: Please call and advise. Thank you

## 2023-06-15 ENCOUNTER — OFFICE VISIT (OUTPATIENT)
Dept: SURGERY | Facility: CLINIC | Age: 38
End: 2023-06-15
Payer: MEDICAID

## 2023-06-15 VITALS
HEART RATE: 57 BPM | SYSTOLIC BLOOD PRESSURE: 149 MMHG | HEIGHT: 62 IN | DIASTOLIC BLOOD PRESSURE: 95 MMHG | WEIGHT: 145.94 LBS | TEMPERATURE: 99 F | RESPIRATION RATE: 16 BRPM | BODY MASS INDEX: 26.86 KG/M2

## 2023-06-15 DIAGNOSIS — Z98.890 POST-OPERATIVE STATE: Primary | ICD-10-CM

## 2023-06-15 PROCEDURE — 3077F PR MOST RECENT SYSTOLIC BLOOD PRESSURE >= 140 MM HG: ICD-10-PCS | Mod: CPTII,,, | Performed by: STUDENT IN AN ORGANIZED HEALTH CARE EDUCATION/TRAINING PROGRAM

## 2023-06-15 PROCEDURE — 3077F SYST BP >= 140 MM HG: CPT | Mod: CPTII,,, | Performed by: STUDENT IN AN ORGANIZED HEALTH CARE EDUCATION/TRAINING PROGRAM

## 2023-06-15 PROCEDURE — 3008F BODY MASS INDEX DOCD: CPT | Mod: CPTII,,, | Performed by: STUDENT IN AN ORGANIZED HEALTH CARE EDUCATION/TRAINING PROGRAM

## 2023-06-15 PROCEDURE — 3008F PR BODY MASS INDEX (BMI) DOCUMENTED: ICD-10-PCS | Mod: CPTII,,, | Performed by: STUDENT IN AN ORGANIZED HEALTH CARE EDUCATION/TRAINING PROGRAM

## 2023-06-15 PROCEDURE — 99024 POSTOP FOLLOW-UP VISIT: CPT | Mod: ,,, | Performed by: STUDENT IN AN ORGANIZED HEALTH CARE EDUCATION/TRAINING PROGRAM

## 2023-06-15 PROCEDURE — 99999 PR PBB SHADOW E&M-EST. PATIENT-LVL III: CPT | Mod: PBBFAC,,, | Performed by: STUDENT IN AN ORGANIZED HEALTH CARE EDUCATION/TRAINING PROGRAM

## 2023-06-15 PROCEDURE — 1159F PR MEDICATION LIST DOCUMENTED IN MEDICAL RECORD: ICD-10-PCS | Mod: CPTII,,, | Performed by: STUDENT IN AN ORGANIZED HEALTH CARE EDUCATION/TRAINING PROGRAM

## 2023-06-15 PROCEDURE — 3080F PR MOST RECENT DIASTOLIC BLOOD PRESSURE >= 90 MM HG: ICD-10-PCS | Mod: CPTII,,, | Performed by: STUDENT IN AN ORGANIZED HEALTH CARE EDUCATION/TRAINING PROGRAM

## 2023-06-15 PROCEDURE — 3080F DIAST BP >= 90 MM HG: CPT | Mod: CPTII,,, | Performed by: STUDENT IN AN ORGANIZED HEALTH CARE EDUCATION/TRAINING PROGRAM

## 2023-06-15 PROCEDURE — 99999 PR PBB SHADOW E&M-EST. PATIENT-LVL III: ICD-10-PCS | Mod: PBBFAC,,, | Performed by: STUDENT IN AN ORGANIZED HEALTH CARE EDUCATION/TRAINING PROGRAM

## 2023-06-15 PROCEDURE — 99024 PR POST-OP FOLLOW-UP VISIT: ICD-10-PCS | Mod: ,,, | Performed by: STUDENT IN AN ORGANIZED HEALTH CARE EDUCATION/TRAINING PROGRAM

## 2023-06-15 PROCEDURE — 1159F MED LIST DOCD IN RCRD: CPT | Mod: CPTII,,, | Performed by: STUDENT IN AN ORGANIZED HEALTH CARE EDUCATION/TRAINING PROGRAM

## 2023-06-15 PROCEDURE — 99213 OFFICE O/P EST LOW 20 MIN: CPT | Mod: PBBFAC,PN | Performed by: STUDENT IN AN ORGANIZED HEALTH CARE EDUCATION/TRAINING PROGRAM

## 2023-06-15 NOTE — PROGRESS NOTES
Postop note     Patient underwent robotic cholecystectomy.  Complaining of some incisional/musculoskeletal pain near the left upper quadrant incision.      Vitals are stable   Abdomen is benign   Incisions are all well approximated     Pathology:  Benign gallbladder    Overall seems to be doing reasonably well.  I suspect her pain is likely musculoskeletal/postoperative in nature.  Discussed ice/, NSAIDs, and stretching.  No concerning signs or symptoms currently although we did discuss the possibility of repeat imaging should symptoms fail to resolve on their own and in the next few weeks.

## 2023-07-06 ENCOUNTER — HOSPITAL ENCOUNTER (EMERGENCY)
Facility: HOSPITAL | Age: 38
Discharge: HOME | End: 2023-07-06
Attending: EMERGENCY MEDICINE
Payer: MEDICAID

## 2023-07-06 VITALS
OXYGEN SATURATION: 99 % | TEMPERATURE: 98 F | HEART RATE: 72 BPM | SYSTOLIC BLOOD PRESSURE: 161 MMHG | BODY MASS INDEX: 26.52 KG/M2 | RESPIRATION RATE: 19 BRPM | DIASTOLIC BLOOD PRESSURE: 99 MMHG | WEIGHT: 145 LBS

## 2023-07-06 DIAGNOSIS — D57.00 SICKLE CELL PAIN CRISIS: Primary | ICD-10-CM

## 2023-07-06 DIAGNOSIS — R07.9 CHEST PAIN: ICD-10-CM

## 2023-07-06 LAB
ALBUMIN SERPL BCP-MCNC: 4.2 G/DL (ref 3.5–5.2)
ALP SERPL-CCNC: 90 U/L (ref 55–135)
ALT SERPL W/O P-5'-P-CCNC: 19 U/L (ref 10–44)
ANION GAP SERPL CALC-SCNC: 11 MMOL/L (ref 8–16)
AST SERPL-CCNC: 22 U/L (ref 10–40)
BASOPHILS # BLD AUTO: 0.11 K/UL (ref 0–0.2)
BASOPHILS NFR BLD: 0.8 % (ref 0–1.9)
BILIRUB SERPL-MCNC: 1.2 MG/DL (ref 0.1–1)
BILIRUB UR QL STRIP: NEGATIVE
BUN SERPL-MCNC: 7 MG/DL (ref 6–20)
CALCIUM SERPL-MCNC: 8.8 MG/DL (ref 8.7–10.5)
CHLORIDE SERPL-SCNC: 108 MMOL/L (ref 95–110)
CLARITY UR: CLEAR
CO2 SERPL-SCNC: 24 MMOL/L (ref 23–29)
COLOR UR: YELLOW
CREAT SERPL-MCNC: 0.8 MG/DL (ref 0.5–1.4)
DIFFERENTIAL METHOD: ABNORMAL
EOSINOPHIL # BLD AUTO: 0.3 K/UL (ref 0–0.5)
EOSINOPHIL NFR BLD: 2 % (ref 0–8)
ERYTHROCYTE [DISTWIDTH] IN BLOOD BY AUTOMATED COUNT: 18.4 % (ref 11.5–14.5)
EST. GFR  (NO RACE VARIABLE): >60 ML/MIN/1.73 M^2
GLUCOSE SERPL-MCNC: 91 MG/DL (ref 70–110)
GLUCOSE UR QL STRIP: NEGATIVE
HCT VFR BLD AUTO: 28.7 % (ref 37–48.5)
HGB BLD-MCNC: 9.9 G/DL (ref 12–16)
HGB UR QL STRIP: NEGATIVE
IMM GRANULOCYTES # BLD AUTO: 0.04 K/UL (ref 0–0.04)
IMM GRANULOCYTES NFR BLD AUTO: 0.3 % (ref 0–0.5)
KETONES UR QL STRIP: NEGATIVE
LEUKOCYTE ESTERASE UR QL STRIP: NEGATIVE
LYMPHOCYTES # BLD AUTO: 2.8 K/UL (ref 1–4.8)
LYMPHOCYTES NFR BLD: 21.5 % (ref 18–48)
MCH RBC QN AUTO: 22.1 PG (ref 27–31)
MCHC RBC AUTO-ENTMCNC: 34.5 G/DL (ref 32–36)
MCV RBC AUTO: 64 FL (ref 82–98)
MONOCYTES # BLD AUTO: 1 K/UL (ref 0.3–1)
MONOCYTES NFR BLD: 7.9 % (ref 4–15)
NEUTROPHILS # BLD AUTO: 8.9 K/UL (ref 1.8–7.7)
NEUTROPHILS NFR BLD: 67.5 % (ref 38–73)
NITRITE UR QL STRIP: NEGATIVE
NRBC BLD-RTO: 3 /100 WBC
PH UR STRIP: 7 [PH] (ref 5–8)
PLATELET # BLD AUTO: 487 K/UL (ref 150–450)
PMV BLD AUTO: 9.6 FL (ref 9.2–12.9)
POTASSIUM SERPL-SCNC: 3.6 MMOL/L (ref 3.5–5.1)
PROT SERPL-MCNC: 7.2 G/DL (ref 6–8.4)
PROT UR QL STRIP: NEGATIVE
RBC # BLD AUTO: 4.48 M/UL (ref 4–5.4)
RETICS/RBC NFR AUTO: 4.3 % (ref 0.5–2.5)
SODIUM SERPL-SCNC: 143 MMOL/L (ref 136–145)
SP GR UR STRIP: 1 (ref 1–1.03)
TROPONIN I SERPL DL<=0.01 NG/ML-MCNC: <0.006 NG/ML (ref 0–0.03)
URN SPEC COLLECT METH UR: NORMAL
UROBILINOGEN UR STRIP-ACNC: NEGATIVE EU/DL
WBC # BLD AUTO: 13.14 K/UL (ref 3.9–12.7)

## 2023-07-06 PROCEDURE — 93010 ELECTROCARDIOGRAM REPORT: CPT | Mod: ,,, | Performed by: INTERNAL MEDICINE

## 2023-07-06 PROCEDURE — 63600175 PHARM REV CODE 636 W HCPCS: Performed by: EMERGENCY MEDICINE

## 2023-07-06 PROCEDURE — 96376 TX/PRO/DX INJ SAME DRUG ADON: CPT

## 2023-07-06 PROCEDURE — 96361 HYDRATE IV INFUSION ADD-ON: CPT

## 2023-07-06 PROCEDURE — 81003 URINALYSIS AUTO W/O SCOPE: CPT | Performed by: EMERGENCY MEDICINE

## 2023-07-06 PROCEDURE — 93005 ELECTROCARDIOGRAM TRACING: CPT

## 2023-07-06 PROCEDURE — 80053 COMPREHEN METABOLIC PANEL: CPT | Performed by: EMERGENCY MEDICINE

## 2023-07-06 PROCEDURE — 93010 EKG 12-LEAD: ICD-10-PCS | Mod: ,,, | Performed by: INTERNAL MEDICINE

## 2023-07-06 PROCEDURE — 99284 EMERGENCY DEPT VISIT MOD MDM: CPT | Mod: 25

## 2023-07-06 PROCEDURE — 85025 COMPLETE CBC W/AUTO DIFF WBC: CPT | Performed by: EMERGENCY MEDICINE

## 2023-07-06 PROCEDURE — 85045 AUTOMATED RETICULOCYTE COUNT: CPT | Performed by: EMERGENCY MEDICINE

## 2023-07-06 PROCEDURE — 84484 ASSAY OF TROPONIN QUANT: CPT | Performed by: EMERGENCY MEDICINE

## 2023-07-06 PROCEDURE — 36415 COLL VENOUS BLD VENIPUNCTURE: CPT | Performed by: EMERGENCY MEDICINE

## 2023-07-06 PROCEDURE — 96374 THER/PROPH/DIAG INJ IV PUSH: CPT

## 2023-07-06 PROCEDURE — 25000003 PHARM REV CODE 250: Performed by: EMERGENCY MEDICINE

## 2023-07-06 PROCEDURE — 96375 TX/PRO/DX INJ NEW DRUG ADDON: CPT

## 2023-07-06 RX ORDER — MORPHINE SULFATE 15 MG/1
15 TABLET, FILM COATED, EXTENDED RELEASE ORAL 2 TIMES DAILY
Qty: 4 TABLET | Refills: 0 | Status: SHIPPED | OUTPATIENT
Start: 2023-07-06 | End: 2023-07-08

## 2023-07-06 RX ORDER — KETOROLAC TROMETHAMINE 30 MG/ML
15 INJECTION, SOLUTION INTRAMUSCULAR; INTRAVENOUS
Status: COMPLETED | OUTPATIENT
Start: 2023-07-06 | End: 2023-07-06

## 2023-07-06 RX ORDER — MORPHINE SULFATE 4 MG/ML
4 INJECTION, SOLUTION INTRAMUSCULAR; INTRAVENOUS
Status: COMPLETED | OUTPATIENT
Start: 2023-07-06 | End: 2023-07-06

## 2023-07-06 RX ADMIN — SODIUM CHLORIDE 1000 ML: 9 INJECTION, SOLUTION INTRAVENOUS at 03:07

## 2023-07-06 RX ADMIN — MORPHINE SULFATE 4 MG: 4 INJECTION, SOLUTION INTRAMUSCULAR; INTRAVENOUS at 03:07

## 2023-07-06 RX ADMIN — KETOROLAC TROMETHAMINE 15 MG: 30 INJECTION, SOLUTION INTRAMUSCULAR; INTRAVENOUS at 06:07

## 2023-07-06 RX ADMIN — KETOROLAC TROMETHAMINE 15 MG: 30 INJECTION, SOLUTION INTRAMUSCULAR; INTRAVENOUS at 03:07

## 2023-07-06 NOTE — ED NOTES
Pt in possession of all belongings.  IV removed.  VSS; no signs of distress.  Pt to be escorted home by friend in personal auto.  Discharge instructions and medications given to pt and explained by RN; pt verbalized understanding.  Pt agreed with care and discharge.

## 2023-07-06 NOTE — ED PROVIDER NOTES
Encounter Date: 2023       History     Chief Complaint   Patient presents with    Sickle Cell Pain Crisis     Midsternal chest pain x 6-7 hrs, with leg aches.     This patient has a prior history of sickle cell pain crisis and has beta thalassemia.  Patient presents to the emergency department complaints of lower back pain upper back pain and pain in her anterior chest wall.  Patient states she has not had an episode in quite some time.  Patient denies any other symptomatology has no dysuria frequency hesitancy or burning with urination.  Patient also denies any shortness of breath.    The history is provided by the patient.   Review of patient's allergies indicates:   Allergen Reactions    Vicodin [hydrocodone-acetaminophen] Itching    Hydrocodone Rash     Past Medical History:   Diagnosis Date    Avascular necrosis of hip left    Beta 0 thalassemia     Encounter for blood transfusion     Environmental allergies     Sickle-cell disease without crisis      Past Surgical History:   Procedure Laterality Date     SECTION      ROBOT-ASSISTED CHOLECYSTECTOMY N/A 2023    Procedure: ROBOTIC CHOLECYSTECTOMY;  Surgeon: Maik Escobedo MD;  Location: [facility];  Service: General;  Laterality: N/A;     Family History   Family history unknown: Yes     Social History     Tobacco Use    Smoking status: Never    Smokeless tobacco: Never   Substance Use Topics    Alcohol use: No    Drug use: No     Review of Systems   Constitutional: Negative.    HENT: Negative.     Eyes: Negative.    Respiratory: Negative.     Cardiovascular:  Positive for chest pain.   Gastrointestinal: Negative.  Negative for abdominal pain.   Endocrine: Negative.    Genitourinary: Negative.    Musculoskeletal:  Positive for back pain.   Skin: Negative.    Allergic/Immunologic: Negative.    Neurological: Negative.    Hematological: Negative.    Psychiatric/Behavioral: Negative.     All other systems reviewed and are negative.    Physical Exam      Initial Vitals [07/06/23 0323]   BP Pulse Resp Temp SpO2   (!) 145/89 76 20 98.4 °F (36.9 °C) 99 %      MAP       --         Physical Exam    Nursing note and vitals reviewed.  Constitutional: Vital signs are normal. She appears well-developed. She is active and cooperative.   HENT:   Head: Normocephalic and atraumatic.   Eyes: Conjunctivae, EOM and lids are normal. Pupils are equal, round, and reactive to light.   Neck: Trachea normal. Neck supple. No thyroid mass present.    Full passive range of motion without pain.     Cardiovascular:  Normal rate, regular rhythm, S1 normal, S2 normal, normal heart sounds, intact distal pulses and normal pulses.           Pulmonary/Chest: Effort normal and breath sounds normal.   Abdominal: Abdomen is soft and flat. Bowel sounds are normal. There is no abdominal tenderness.   Musculoskeletal:         General: Normal range of motion.      Cervical back: Normal, full passive range of motion without pain and neck supple.      Thoracic back: Normal.      Lumbar back: Normal.     Lymphadenopathy:     She has no axillary adenopathy.   Neurological: She is alert and oriented to person, place, and time. She has normal strength. No cranial nerve deficit or sensory deficit. GCS eye subscore is 4. GCS verbal subscore is 5. GCS motor subscore is 6.   Skin: Skin is warm, dry and intact.   Psychiatric: She has a normal mood and affect. Her speech is normal and behavior is normal. Judgment and thought content normal. Cognition and memory are normal.       ED Course   Procedures  Labs Reviewed   CBC W/ AUTO DIFFERENTIAL - Abnormal; Notable for the following components:       Result Value    WBC 13.14 (*)     Hemoglobin 9.9 (*)     Hematocrit 28.7 (*)     MCV 64 (*)     MCH 22.1 (*)     RDW 18.4 (*)     Platelets 487 (*)     Gran # (ANC) 8.9 (*)     nRBC 3 (*)     All other components within normal limits   COMPREHENSIVE METABOLIC PANEL - Abnormal; Notable for the following components:     Total Bilirubin 1.2 (*)     All other components within normal limits   RETICULOCYTES - Abnormal; Notable for the following components:    Retic 4.3 (*)     All other components within normal limits   URINALYSIS   TROPONIN I     EKG Readings: (Independently Interpreted)   Previous EKG: Compared with most recent EKG Previous EKG Date: 3/3/23. Rhythm: Normal Sinus Rhythm. Heart Rate: 60. Ectopy: No Ectopy. Conduction: Normal. T Waves Flipped: III, AVF, V3, V4, V5 and V6. Clinical Impression: Normal Sinus Rhythm     Imaging Results    None          Medications   morphine injection 4 mg (4 mg Intravenous Given 7/6/23 0356)   ketorolac injection 15 mg (15 mg Intravenous Given 7/6/23 0354)   sodium chloride 0.9% bolus 1,000 mL 1,000 mL (0 mLs Intravenous Stopped 7/6/23 0529)   ketorolac injection 15 mg (15 mg Intravenous Given 7/6/23 0622)     Medical Decision Making:   ED Management:  This patient has beta thalassemia and has pain associated with this.  Patient's laboratory data is essentially unremarkable.  I provided the patient with appropriate pain control here in the emergency department with almost complete resolution of her pain.  Patient is currently out of her morphine at home.  Patient's troponin is negative patient's EKG is unchanged compared to 03/03/2023.  Feel this patient can be safely discharged home with instructions to follow up with the primary health care provider.                        Clinical Impression:   Final diagnoses:  [R07.9] Chest pain  [D57.00] Sickle cell pain crisis (Primary)        ED Disposition Condition    Discharge Stable          ED Prescriptions       Medication Sig Dispense Start Date End Date Auth. Provider    morphine (MS CONTIN) 15 MG 12 hr tablet Take 1 tablet (15 mg total) by mouth 2 (two) times daily. for 2 days 4 tablet 7/6/2023 7/8/2023 Darrin Jackson MD          Follow-up Information       Follow up With Specialties Details Why Contact Info    Jagdish RAMOS  MD [facility] Schedule an appointment as soon as possible for a visit in 3 days  [address] [place] [address]  [facility] [address]  [phone]               Darrin Jackson MD  07/06/23 0692

## 2023-11-05 ENCOUNTER — HOSPITAL ENCOUNTER (EMERGENCY)
Facility: HOSPITAL | Age: 38
Discharge: HOME | End: 2023-11-05
Attending: EMERGENCY MEDICINE
Payer: MEDICAID

## 2023-11-05 VITALS
WEIGHT: 145 LBS | DIASTOLIC BLOOD PRESSURE: 87 MMHG | RESPIRATION RATE: 18 BRPM | BODY MASS INDEX: 26.68 KG/M2 | SYSTOLIC BLOOD PRESSURE: 132 MMHG | TEMPERATURE: 99 F | HEIGHT: 62 IN | HEART RATE: 87 BPM | OXYGEN SATURATION: 98 %

## 2023-11-05 DIAGNOSIS — R05.9 COUGH: ICD-10-CM

## 2023-11-05 DIAGNOSIS — B34.9 VIRAL ILLNESS: Primary | ICD-10-CM

## 2023-11-05 LAB
ALBUMIN SERPL BCP-MCNC: 4.5 G/DL (ref 3.5–5.2)
ALP SERPL-CCNC: 90 U/L (ref 55–135)
ALT SERPL W/O P-5'-P-CCNC: 23 U/L (ref 10–44)
ANION GAP SERPL CALC-SCNC: 8 MMOL/L (ref 8–16)
AST SERPL-CCNC: 22 U/L (ref 10–40)
B-HCG UR QL: NEGATIVE
BASOPHILS # BLD AUTO: 0.03 K/UL (ref 0–0.2)
BASOPHILS NFR BLD: 0.3 % (ref 0–1.9)
BILIRUB SERPL-MCNC: 1.2 MG/DL (ref 0.1–1)
BILIRUB UR QL STRIP: NEGATIVE
BUN SERPL-MCNC: 7 MG/DL (ref 6–20)
CALCIUM SERPL-MCNC: 9.6 MG/DL (ref 8.7–10.5)
CHLORIDE SERPL-SCNC: 104 MMOL/L (ref 95–110)
CLARITY UR: CLEAR
CO2 SERPL-SCNC: 26 MMOL/L (ref 23–29)
COLOR UR: YELLOW
CREAT SERPL-MCNC: 1.1 MG/DL (ref 0.5–1.4)
CTP QC/QA: YES
DIFFERENTIAL METHOD: ABNORMAL
EOSINOPHIL # BLD AUTO: 0.2 K/UL (ref 0–0.5)
EOSINOPHIL NFR BLD: 1.6 % (ref 0–8)
ERYTHROCYTE [DISTWIDTH] IN BLOOD BY AUTOMATED COUNT: 16.7 % (ref 11.5–14.5)
EST. GFR  (NO RACE VARIABLE): >60 ML/MIN/1.73 M^2
GLUCOSE SERPL-MCNC: 86 MG/DL (ref 70–110)
GLUCOSE UR QL STRIP: NEGATIVE
HCT VFR BLD AUTO: 30.6 % (ref 37–48.5)
HGB BLD-MCNC: 10.3 G/DL (ref 12–16)
HGB UR QL STRIP: NEGATIVE
IMM GRANULOCYTES # BLD AUTO: 0.03 K/UL (ref 0–0.04)
IMM GRANULOCYTES NFR BLD AUTO: 0.3 % (ref 0–0.5)
KETONES UR QL STRIP: NEGATIVE
LEUKOCYTE ESTERASE UR QL STRIP: NEGATIVE
LIPASE SERPL-CCNC: 19 U/L (ref 4–60)
LYMPHOCYTES # BLD AUTO: 2.4 K/UL (ref 1–4.8)
LYMPHOCYTES NFR BLD: 22.2 % (ref 18–48)
MCH RBC QN AUTO: 21.5 PG (ref 27–31)
MCHC RBC AUTO-ENTMCNC: 33.7 G/DL (ref 32–36)
MCV RBC AUTO: 64 FL (ref 82–98)
MONOCYTES # BLD AUTO: 0.8 K/UL (ref 0.3–1)
MONOCYTES NFR BLD: 6.9 % (ref 4–15)
NEUTROPHILS # BLD AUTO: 7.5 K/UL (ref 1.8–7.7)
NEUTROPHILS NFR BLD: 68.7 % (ref 38–73)
NITRITE UR QL STRIP: NEGATIVE
NRBC BLD-RTO: 1 /100 WBC
PH UR STRIP: 5 [PH] (ref 5–8)
PLATELET # BLD AUTO: 450 K/UL (ref 150–450)
PMV BLD AUTO: 9.2 FL (ref 9.2–12.9)
POTASSIUM SERPL-SCNC: 4.2 MMOL/L (ref 3.5–5.1)
PROT SERPL-MCNC: 8.2 G/DL (ref 6–8.4)
PROT UR QL STRIP: NEGATIVE
RBC # BLD AUTO: 4.79 M/UL (ref 4–5.4)
RETICS/RBC NFR AUTO: 3.2 % (ref 0.5–2.5)
SARS-COV-2 RDRP RESP QL NAA+PROBE: NEGATIVE
SODIUM SERPL-SCNC: 138 MMOL/L (ref 136–145)
SP GR UR STRIP: 1.01 (ref 1–1.03)
URN SPEC COLLECT METH UR: NORMAL
UROBILINOGEN UR STRIP-ACNC: NEGATIVE EU/DL
WBC # BLD AUTO: 10.95 K/UL (ref 3.9–12.7)

## 2023-11-05 PROCEDURE — 99284 EMERGENCY DEPT VISIT MOD MDM: CPT | Mod: 25

## 2023-11-05 PROCEDURE — 96361 HYDRATE IV INFUSION ADD-ON: CPT

## 2023-11-05 PROCEDURE — 63600175 PHARM REV CODE 636 W HCPCS: Performed by: NURSE PRACTITIONER

## 2023-11-05 PROCEDURE — 85045 AUTOMATED RETICULOCYTE COUNT: CPT | Performed by: NURSE PRACTITIONER

## 2023-11-05 PROCEDURE — 81025 URINE PREGNANCY TEST: CPT | Performed by: NURSE PRACTITIONER

## 2023-11-05 PROCEDURE — 83690 ASSAY OF LIPASE: CPT | Performed by: NURSE PRACTITIONER

## 2023-11-05 PROCEDURE — 80053 COMPREHEN METABOLIC PANEL: CPT | Performed by: NURSE PRACTITIONER

## 2023-11-05 PROCEDURE — 85025 COMPLETE CBC W/AUTO DIFF WBC: CPT | Performed by: NURSE PRACTITIONER

## 2023-11-05 PROCEDURE — 25000003 PHARM REV CODE 250: Performed by: NURSE PRACTITIONER

## 2023-11-05 PROCEDURE — 36415 COLL VENOUS BLD VENIPUNCTURE: CPT | Performed by: NURSE PRACTITIONER

## 2023-11-05 PROCEDURE — 81003 URINALYSIS AUTO W/O SCOPE: CPT | Performed by: NURSE PRACTITIONER

## 2023-11-05 PROCEDURE — 96374 THER/PROPH/DIAG INJ IV PUSH: CPT

## 2023-11-05 PROCEDURE — U0002 COVID-19 LAB TEST NON-CDC: HCPCS | Performed by: NURSE PRACTITIONER

## 2023-11-05 RX ORDER — SODIUM CHLORIDE 9 MG/ML
1000 INJECTION, SOLUTION INTRAVENOUS
Status: COMPLETED | OUTPATIENT
Start: 2023-11-05 | End: 2023-11-05

## 2023-11-05 RX ORDER — ONDANSETRON 2 MG/ML
8 INJECTION INTRAMUSCULAR; INTRAVENOUS
Status: COMPLETED | OUTPATIENT
Start: 2023-11-05 | End: 2023-11-05

## 2023-11-05 RX ADMIN — SODIUM CHLORIDE 1000 ML: 9 INJECTION, SOLUTION INTRAVENOUS at 05:11

## 2023-11-05 RX ADMIN — ONDANSETRON 8 MG: 2 INJECTION INTRAMUSCULAR; INTRAVENOUS at 04:11

## 2023-11-05 RX ADMIN — SODIUM CHLORIDE 1000 ML: 9 INJECTION, SOLUTION INTRAVENOUS at 03:11

## 2023-11-05 NOTE — ED PROVIDER NOTES
Encounter Date: 2023       History     Chief Complaint   Patient presents with    Urinary Tract Infection     Patient is a 38 y.o. female who presents to the ED 2023 with a chief complaint of diarrhea that started this morning.  She states she had 5 or 6 liquid stools.  She denies any blood in her stool.  She has not had any recent travel.  She states that she has had intermittent vomiting over the last few days with decreased appetite.  She is not had any fever.  She states she is had some cramping but no abdominal pain.  She states she went to the urgent care yesterday and they told her she had a UTI and she started antibiotics for that this morning.  She denies any chest pain or flank pain.  She has had a mild cough and congestion as well.  She denies any shortness of breath.  She is concerned she is becoming dehydrated and wanted to stay ahead of it as she does have a history of sickle cell anemia.  She did take some nausea medicine at home this morning.             Review of patient's allergies indicates:   Allergen Reactions    Vicodin [hydrocodone-acetaminophen] Itching    Hydrocodone Rash     Past Medical History:   Diagnosis Date    Avascular necrosis of hip left    Beta 0 thalassemia     Encounter for blood transfusion     Environmental allergies     Sickle-cell disease without crisis      Past Surgical History:   Procedure Laterality Date     SECTION      ROBOT-ASSISTED CHOLECYSTECTOMY N/A 2023    Procedure: ROBOTIC CHOLECYSTECTOMY;  Surgeon: Maik Escobedo MD;  Location: [facility];  Service: General;  Laterality: N/A;     Family History   Family history unknown: Yes     Social History     Tobacco Use    Smoking status: Never    Smokeless tobacco: Never   Substance Use Topics    Alcohol use: No    Drug use: No     Review of Systems   Constitutional:  Negative for chills and fever.   HENT:  Negative for sore throat.    Respiratory:  Negative for chest tightness and shortness of  breath.    Cardiovascular:  Negative for chest pain.   Gastrointestinal:  Positive for diarrhea, nausea and vomiting. Negative for abdominal pain, blood in stool, constipation and rectal pain.   Genitourinary:  Negative for difficulty urinating, dysuria, flank pain, pelvic pain, vaginal bleeding and vaginal discharge.   Musculoskeletal:  Negative for arthralgias and myalgias.   Skin:  Negative for rash and wound.   Neurological:  Negative for syncope.   Hematological:  Does not bruise/bleed easily.       Physical Exam     Initial Vitals [11/05/23 1440]   BP Pulse Resp Temp SpO2   (!) 141/86 92 18 98.6 °F (37 °C) 98 %      MAP       --         Physical Exam    Nursing note and vitals reviewed.  Constitutional: Vital signs are normal. She appears well-developed and well-nourished.   HENT:   Head: Normocephalic and atraumatic.   Eyes: Pupils are equal, round, and reactive to light.   Neck: Neck supple.   Cardiovascular:  Normal rate, regular rhythm, normal heart sounds and intact distal pulses.     Exam reveals no gallop and no friction rub.       No murmur heard.  Pulmonary/Chest: Breath sounds normal. She has no wheezes. She has no rhonchi. She has no rales.   Abdominal: Abdomen is soft. Bowel sounds are normal. There is no abdominal tenderness.   Musculoskeletal:      Cervical back: Neck supple.     Neurological: She is alert and oriented to person, place, and time. She has normal strength.   Skin: Skin is warm, dry and intact.   Psychiatric: She has a normal mood and affect. Her speech is normal and behavior is normal.         ED Course   Procedures  Labs Reviewed   CBC W/ AUTO DIFFERENTIAL - Abnormal; Notable for the following components:       Result Value    Hemoglobin 10.3 (*)     Hematocrit 30.6 (*)     MCV 64 (*)     MCH 21.5 (*)     RDW 16.7 (*)     nRBC 1 (*)     All other components within normal limits   COMPREHENSIVE METABOLIC PANEL - Abnormal; Notable for the following components:    Total Bilirubin 1.2  (*)     All other components within normal limits   RETICULOCYTES - Abnormal; Notable for the following components:    Retic 3.2 (*)     All other components within normal limits   URINALYSIS, REFLEX TO URINE CULTURE    Narrative:     Specimen Source->Urine   LIPASE   SARS-COV-2 RNA AMPLIFICATION, QUAL   POCT URINE PREGNANCY          Imaging Results              X-Ray Chest AP Portable (Final result)  Result time 11/05/23 16:07:06      Final result by Vamsi Ledbetter MD (11/05/23 16:07:06)                   Impression:      No acute cardiopulmonary abnormality appreciated radiographically.  No interval detrimental change when compared to the prior study.      Electronically signed by: Romeo Ledbetter MD  Date:    11/05/2023  Time:    16:07               Narrative:    EXAMINATION:  XR CHEST AP PORTABLE    CLINICAL HISTORY:  Cough, unspecified    TECHNIQUE:  A single portable upright AP chest radiograph was acquired.    COMPARISON:  Chest x-ray-03/03/2022    FINDINGS:  The cardiomediastinal silhouette is normal in appearance.  No pulmonary vascular congestion appreciated. No airspace consolidation or pulmonary mass. No significant volume of pleural fluid or pneumothorax.  There is a dextroconvex curvature of the thoracic spine.                                       Medications   0.9%  NaCl infusion (has no administration in time range)   sodium chloride 0.9% bolus 1,000 mL 1,000 mL (1,000 mLs Intravenous New Bag 11/5/23 1516)   ondansetron injection 8 mg (8 mg Intravenous Given 11/5/23 1609)     Medical Decision Making  Amount and/or Complexity of Data Reviewed  Labs: ordered.  Radiology: ordered.    Risk  Prescription drug management.         APC / Resident Notes:   Patient is a 38 y.o. female who presents to the ED 11/05/2023 who underwent emergent evaluation for Diarrhea with intermittent nausea and vomiting.  She is a benign abdominal exam.  She did start antibiotics this morning for UTI and urinalysis today  without evidence of UTI.  I do not think pyelonephritis.  Is given aggressive IV fluid rehydration in the emergency department and is not vomiting and is tolerating p.o. with no diarrhea in the emergency department.  She has had no recent travel.  No risk factors for C diff. see no indication for further antibiotics at this time.  No severe dehydration or electrolyte abnormalities.  No evidence of sickle cell crisis.  Her anemia appears to be at baseline.  Retake count better than previous and is 3.2.  No evidence of other acute bacterial infection such as pneumonia at this time.  Do not think other emergent intra-abdominal process such as obstruction, colitis, diverticulitis.  She has a history of cholecystectomy.  Mildly elevated bilirubin is unchanged from previous..  Patient states she is not having any pain requiring analgesia at this time and was just trying to prevent a dehydration crisis. Based on my clinical evaluation, I do not appreciate any immediate, emergent, or life threatening condition or etiology that warrants additional workup today and feel that the patient can be discharged with close follow up care. Case discussed with Dr. Richey  who is agreeable to plan of care. Follow up and return precautions discussed; patient verbalized understanding and is agreeable to plan of care. Patient discharged home in stable condition.                              Medical Decision Making:   Differential Diagnosis:   Viral illness  UTI  Pneumonia  Dehydration        Clinical Impression:   Final diagnoses:  [R05.9] Cough  [B34.9] Viral illness (Primary)        ED Disposition Condition    Discharge Stable          ED Prescriptions    None       Follow-up Information       Follow up With Specialties Details Why Contact Info Additional Information    Yunier Oliveira MD Internal Medicine In 1 week  [address] [facility] [address]  [phone]       [facility]  As needed, If  symptoms worsen [address] [place] [address] [address] [facility] [id number] 1st floor             Monserrat Shin, YANET  11/05/23 5444

## 2023-11-05 NOTE — Clinical Note
"Shi Monroy"Jayant was seen and treated in our emergency department on 11/5/2023.  She may return to work on 11/08/2023.       If you have any questions or concerns, please don't hesitate to call.      CHARAN Condon RN    "

## 2023-11-11 ENCOUNTER — HOSPITAL ENCOUNTER (EMERGENCY)
Facility: HOSPITAL | Age: 38
Discharge: HOME | End: 2023-11-12
Attending: EMERGENCY MEDICINE
Payer: MEDICAID

## 2023-11-11 DIAGNOSIS — M79.604 BILATERAL LEG PAIN: ICD-10-CM

## 2023-11-11 DIAGNOSIS — M79.605 BILATERAL LEG PAIN: ICD-10-CM

## 2023-11-11 DIAGNOSIS — D57.00 SICKLE CELL PAIN CRISIS: ICD-10-CM

## 2023-11-11 DIAGNOSIS — M54.50 ACUTE BILATERAL LOW BACK PAIN, UNSPECIFIED WHETHER SCIATICA PRESENT: Primary | ICD-10-CM

## 2023-11-11 LAB
ALBUMIN SERPL BCP-MCNC: 4.1 G/DL (ref 3.5–5.2)
ALP SERPL-CCNC: 81 U/L (ref 55–135)
ALT SERPL W/O P-5'-P-CCNC: 22 U/L (ref 10–44)
ANION GAP SERPL CALC-SCNC: 9 MMOL/L (ref 8–16)
AST SERPL-CCNC: 25 U/L (ref 10–40)
B-HCG UR QL: NEGATIVE
BASOPHILS # BLD AUTO: 0.1 K/UL (ref 0–0.2)
BASOPHILS NFR BLD: 0.6 % (ref 0–1.9)
BILIRUB SERPL-MCNC: 0.6 MG/DL (ref 0.1–1)
BILIRUB UR QL STRIP: NEGATIVE
BUN SERPL-MCNC: 7 MG/DL (ref 6–20)
CALCIUM SERPL-MCNC: 9.3 MG/DL (ref 8.7–10.5)
CHLORIDE SERPL-SCNC: 107 MMOL/L (ref 95–110)
CLARITY UR: CLEAR
CO2 SERPL-SCNC: 27 MMOL/L (ref 23–29)
COLOR UR: YELLOW
CREAT SERPL-MCNC: 1 MG/DL (ref 0.5–1.4)
CTP QC/QA: YES
DIFFERENTIAL METHOD: ABNORMAL
EOSINOPHIL # BLD AUTO: 0.2 K/UL (ref 0–0.5)
EOSINOPHIL NFR BLD: 1.1 % (ref 0–8)
ERYTHROCYTE [DISTWIDTH] IN BLOOD BY AUTOMATED COUNT: 17.3 % (ref 11.5–14.5)
EST. GFR  (NO RACE VARIABLE): >60 ML/MIN/1.73 M^2
GLUCOSE SERPL-MCNC: 106 MG/DL (ref 70–110)
GLUCOSE UR QL STRIP: NEGATIVE
HCT VFR BLD AUTO: 28.2 % (ref 37–48.5)
HGB BLD-MCNC: 9.4 G/DL (ref 12–16)
HGB UR QL STRIP: NEGATIVE
IMM GRANULOCYTES # BLD AUTO: 0.08 K/UL (ref 0–0.04)
IMM GRANULOCYTES NFR BLD AUTO: 0.4 % (ref 0–0.5)
KETONES UR QL STRIP: NEGATIVE
LEUKOCYTE ESTERASE UR QL STRIP: ABNORMAL
LYMPHOCYTES # BLD AUTO: 3.4 K/UL (ref 1–4.8)
LYMPHOCYTES NFR BLD: 19.1 % (ref 18–48)
MCH RBC QN AUTO: 21.4 PG (ref 27–31)
MCHC RBC AUTO-ENTMCNC: 33.3 G/DL (ref 32–36)
MCV RBC AUTO: 64 FL (ref 82–98)
MICROSCOPIC COMMENT: NORMAL
MONOCYTES # BLD AUTO: 1 K/UL (ref 0.3–1)
MONOCYTES NFR BLD: 5.6 % (ref 4–15)
NEUTROPHILS # BLD AUTO: 13 K/UL (ref 1.8–7.7)
NEUTROPHILS NFR BLD: 73.2 % (ref 38–73)
NITRITE UR QL STRIP: NEGATIVE
NRBC BLD-RTO: 2 /100 WBC
PH UR STRIP: 7 [PH] (ref 5–8)
PLATELET # BLD AUTO: 538 K/UL (ref 150–450)
PMV BLD AUTO: 9 FL (ref 9.2–12.9)
POTASSIUM SERPL-SCNC: 4.1 MMOL/L (ref 3.5–5.1)
PROT SERPL-MCNC: 7.6 G/DL (ref 6–8.4)
PROT UR QL STRIP: NEGATIVE
RBC # BLD AUTO: 4.39 M/UL (ref 4–5.4)
RBC #/AREA URNS HPF: 0 /HPF (ref 0–4)
RETICS/RBC NFR AUTO: 4.3 % (ref 0.5–2.5)
SODIUM SERPL-SCNC: 143 MMOL/L (ref 136–145)
SP GR UR STRIP: 1.01 (ref 1–1.03)
SQUAMOUS #/AREA URNS HPF: 0 /HPF
URN SPEC COLLECT METH UR: ABNORMAL
UROBILINOGEN UR STRIP-ACNC: NEGATIVE EU/DL
WBC # BLD AUTO: 17.81 K/UL (ref 3.9–12.7)
WBC #/AREA URNS HPF: 1 /HPF (ref 0–5)

## 2023-11-11 PROCEDURE — 96376 TX/PRO/DX INJ SAME DRUG ADON: CPT

## 2023-11-11 PROCEDURE — 96361 HYDRATE IV INFUSION ADD-ON: CPT

## 2023-11-11 PROCEDURE — 96375 TX/PRO/DX INJ NEW DRUG ADDON: CPT

## 2023-11-11 PROCEDURE — 80053 COMPREHEN METABOLIC PANEL: CPT | Performed by: EMERGENCY MEDICINE

## 2023-11-11 PROCEDURE — 63600175 PHARM REV CODE 636 W HCPCS: Performed by: EMERGENCY MEDICINE

## 2023-11-11 PROCEDURE — 96374 THER/PROPH/DIAG INJ IV PUSH: CPT

## 2023-11-11 PROCEDURE — 85025 COMPLETE CBC W/AUTO DIFF WBC: CPT | Performed by: EMERGENCY MEDICINE

## 2023-11-11 PROCEDURE — 36415 COLL VENOUS BLD VENIPUNCTURE: CPT | Performed by: EMERGENCY MEDICINE

## 2023-11-11 PROCEDURE — 25000003 PHARM REV CODE 250: Performed by: EMERGENCY MEDICINE

## 2023-11-11 PROCEDURE — 81000 URINALYSIS NONAUTO W/SCOPE: CPT | Performed by: EMERGENCY MEDICINE

## 2023-11-11 PROCEDURE — 81025 URINE PREGNANCY TEST: CPT | Performed by: EMERGENCY MEDICINE

## 2023-11-11 PROCEDURE — 85045 AUTOMATED RETICULOCYTE COUNT: CPT | Performed by: EMERGENCY MEDICINE

## 2023-11-11 PROCEDURE — 99285 EMERGENCY DEPT VISIT HI MDM: CPT | Mod: 25

## 2023-11-11 RX ORDER — ONDANSETRON 2 MG/ML
4 INJECTION INTRAMUSCULAR; INTRAVENOUS
Status: COMPLETED | OUTPATIENT
Start: 2023-11-11 | End: 2023-11-11

## 2023-11-11 RX ORDER — MORPHINE SULFATE 2 MG/ML
6 INJECTION, SOLUTION INTRAMUSCULAR; INTRAVENOUS
Status: COMPLETED | OUTPATIENT
Start: 2023-11-11 | End: 2023-11-11

## 2023-11-11 RX ORDER — MORPHINE SULFATE 15 MG/1
15 TABLET, FILM COATED, EXTENDED RELEASE ORAL 2 TIMES DAILY
COMMUNITY

## 2023-11-11 RX ADMIN — MORPHINE SULFATE 6 MG: 2 INJECTION, SOLUTION INTRAMUSCULAR; INTRAVENOUS at 10:11

## 2023-11-11 RX ADMIN — ONDANSETRON 4 MG: 2 INJECTION INTRAMUSCULAR; INTRAVENOUS at 08:11

## 2023-11-11 RX ADMIN — MORPHINE SULFATE 6 MG: 2 INJECTION, SOLUTION INTRAMUSCULAR; INTRAVENOUS at 08:11

## 2023-11-11 RX ADMIN — SODIUM CHLORIDE 1000 ML: 9 INJECTION, SOLUTION INTRAVENOUS at 08:11

## 2023-11-12 VITALS
TEMPERATURE: 98 F | BODY MASS INDEX: 25.4 KG/M2 | WEIGHT: 138 LBS | HEART RATE: 68 BPM | SYSTOLIC BLOOD PRESSURE: 140 MMHG | DIASTOLIC BLOOD PRESSURE: 84 MMHG | OXYGEN SATURATION: 97 % | RESPIRATION RATE: 18 BRPM | HEIGHT: 62 IN

## 2023-11-12 NOTE — ED NOTES
Patient arrived back from CT. A pillow has been placed under her right leg comfort. No other needs or questions at this time.

## 2023-11-12 NOTE — ED PROVIDER NOTES
Encounter Date: 2023       History     Chief Complaint   Patient presents with    Back Pain     Low back pain. Pt states it feels like sickle cell pain     38-year-old female past medical history of beta thalassemia presents today with lower back and bilateral lower extremity pain.  Patient reports pain is consistent with prior sickle cell pain crisis.  Denies any chest pain or shortness of breath.  She reports 1 episode of vomiting.  Nonbloody nonbilious emesis.  She reports some nausea.  Denies any abdominal pain.  Denies any dysuria hematuria.  She does report she recently got over a urinary tract infection but has finished all antibiotics.  Denies any flank pain.  Denies any fevers or chills.  Denies any diarrhea constipation.  Denies any cough, congestion, rhinorrhea or sore throat.    The history is provided by the patient. No  was used.     Review of patient's allergies indicates:   Allergen Reactions    Vicodin [hydrocodone-acetaminophen] Itching    Hydrocodone Rash     Past Medical History:   Diagnosis Date    Avascular necrosis of hip left    Beta 0 thalassemia     Encounter for blood transfusion     Environmental allergies     Sickle-cell disease without crisis      Past Surgical History:   Procedure Laterality Date     SECTION      ROBOT-ASSISTED CHOLECYSTECTOMY N/A 2023    Procedure: ROBOTIC CHOLECYSTECTOMY;  Surgeon: Maik Escobedo MD;  Location: [facility];  Service: General;  Laterality: N/A;     Family History   Family history unknown: Yes     Social History     Tobacco Use    Smoking status: Never    Smokeless tobacco: Never   Substance Use Topics    Alcohol use: No    Drug use: No     Review of Systems    Physical Exam     Initial Vitals [23 1912]   BP Pulse Resp Temp SpO2   138/88 87 18 98.3 °F (36.8 °C) 97 %      MAP       --         Physical Exam    Nursing note and vitals reviewed.  Constitutional: She appears well-developed. She is not  diaphoretic. No distress.   HENT:   Head: Normocephalic and atraumatic.   Nose: Nose normal.   Eyes: EOM are normal. Pupils are equal, round, and reactive to light. No scleral icterus.   Neck: Neck supple.   Normal range of motion.  Cardiovascular:  Normal rate, regular rhythm, normal heart sounds and intact distal pulses.     Exam reveals no gallop and no friction rub.       No murmur heard.  Pulmonary/Chest: Breath sounds normal. No stridor. No respiratory distress. She has no wheezes. She has no rhonchi. She has no rales.   Abdominal: Abdomen is soft. Bowel sounds are normal. She exhibits no distension. There is no abdominal tenderness. There is no rebound and no guarding.   Musculoskeletal:         General: Normal range of motion.      Cervical back: Normal range of motion and neck supple.     Neurological: She is alert and oriented to person, place, and time. She has normal strength. No cranial nerve deficit or sensory deficit. GCS score is 15. GCS eye subscore is 4. GCS verbal subscore is 5. GCS motor subscore is 6.   Skin: Skin is warm and dry. Capillary refill takes less than 2 seconds. No rash noted.   Psychiatric: She has a normal mood and affect.         ED Course   Procedures  Labs Reviewed   URINALYSIS, REFLEX TO URINE CULTURE - Abnormal; Notable for the following components:       Result Value    Leukocytes, UA Trace (*)     All other components within normal limits    Narrative:     Specimen Source->Urine   CBC W/ AUTO DIFFERENTIAL - Abnormal; Notable for the following components:    WBC 17.81 (*)     Hemoglobin 9.4 (*)     Hematocrit 28.2 (*)     MCV 64 (*)     MCH 21.4 (*)     RDW 17.3 (*)     Platelets 538 (*)     MPV 9.0 (*)     Gran # (ANC) 13.0 (*)     Immature Grans (Abs) 0.08 (*)     nRBC 2 (*)     Gran % 73.2 (*)     All other components within normal limits   RETICULOCYTES - Abnormal; Notable for the following components:    Retic 4.3 (*)     All other components within normal limits    URINALYSIS MICROSCOPIC    Narrative:     Specimen Source->Urine   COMPREHENSIVE METABOLIC PANEL   POCT URINE PREGNANCY          Imaging Results              CT Abdomen Pelvis  Without Contrast (In process)                      Medications   ondansetron injection 4 mg (4 mg Intravenous Given 11/11/23 2027)   morphine injection 6 mg (6 mg Intravenous Given 11/11/23 2027)   sodium chloride 0.9% bolus 1,000 mL 1,000 mL (0 mLs Intravenous Stopped 11/11/23 2137)   morphine injection 6 mg (6 mg Intravenous Given 11/11/23 2218)     Medical Decision Making  Given History and Exam at this time, I have low suspicion for Acute Chest Syndrome, Bacteremia, Pneumonia, CVA, Aplastic Crisis, Osteomyelitis, Renal Papillary Necrosis.    Patient's pain has been controlled.  Disposition: Patient will be discharged with strict return precautions and follow up with primary MD within 48 hours for further evaluation.          Amount and/or Complexity of Data Reviewed  Labs: ordered. Decision-making details documented in ED Course.  Radiology: ordered. Decision-making details documented in ED Course.    Risk  Prescription drug management.               ED Course as of 11/12/23 0249   Sat Nov 11, 2023 2155 CT Abdomen Pelvis  Without Contrast  IMPRESSION:  1. No CT evidence of acute intra-abdominal or pelvic process.  2. Surgical absence of the gallbladder.  3. Mild-to-moderate constipation.  Dictated and Authenticated by: Melonie Vences DO  11/11/2023 9:33 PM Central Time ([place] & [place]) [BD]   2155 WBC(!): 17.81 [BD]   2156 Hemoglobin(!): 9.4 [BD]   2156 Retic(!): 4.3 [BD]   Sun Nov 12, 2023   0102 Reticulocyte near baseline.  White blood cell count shows leukocytosis suspect is stress response.  Do not suspect infection or sepsis.  Patient has no infectious complaints other than her recently treated UTI which is now resolved.  UA was negative today. [BD]      ED Course User Index  [BD] Monico Richey MD                    Clinical  Impression:   Final diagnoses:  [M54.50] Acute bilateral low back pain, unspecified whether sciatica present (Primary)  [M79.604, M79.605] Bilateral leg pain  [D57.00] Sickle cell pain crisis        ED Disposition Condition    Discharge Stable          ED Prescriptions    None       Follow-up Information       Follow up With Specialties Details Why Contact Info Additional Information    Yunier Oliveira MD Internal Medicine Go in 2 days  [address] [facility] [address]  [phone]       [facility] Emergency Medicine Go to  As needed, If symptoms worsen [address] [place] [address] [address] [place] [id number] 1st floor             Monico Richey MD  11/12/23 1578

## 2023-11-12 NOTE — ED NOTES
Shi Saba presents to the ED with c/o mid back pain that right leg pain. Patient reports that she is taking an antibiotic for UTI. Patient states that she was outside earlier and feels that the cold weather aggravated her sickle cell. She denies any urinary symptoms. Mucous membranes are pink and moist. Skin is warm, dry and intact. MARIAN KIRK  Verified patient's name and date of birth.

## 2024-01-22 ENCOUNTER — HOSPITAL ENCOUNTER (EMERGENCY)
Facility: HOSPITAL | Age: 39
Discharge: HOME | End: 2024-01-22
Attending: STUDENT IN AN ORGANIZED HEALTH CARE EDUCATION/TRAINING PROGRAM
Payer: MEDICAID

## 2024-01-22 VITALS
WEIGHT: 140 LBS | BODY MASS INDEX: 25.76 KG/M2 | HEART RATE: 85 BPM | RESPIRATION RATE: 19 BRPM | TEMPERATURE: 99 F | DIASTOLIC BLOOD PRESSURE: 90 MMHG | OXYGEN SATURATION: 98 % | HEIGHT: 62 IN | SYSTOLIC BLOOD PRESSURE: 134 MMHG

## 2024-01-22 DIAGNOSIS — S46.012A ROTATOR CUFF STRAIN, LEFT, INITIAL ENCOUNTER: ICD-10-CM

## 2024-01-22 DIAGNOSIS — M25.512 LEFT SHOULDER PAIN: Primary | ICD-10-CM

## 2024-01-22 LAB
B-HCG UR QL: NEGATIVE
CTP QC/QA: YES

## 2024-01-22 PROCEDURE — 25000003 PHARM REV CODE 250: Performed by: STUDENT IN AN ORGANIZED HEALTH CARE EDUCATION/TRAINING PROGRAM

## 2024-01-22 PROCEDURE — 81025 URINE PREGNANCY TEST: CPT | Performed by: PHYSICIAN ASSISTANT

## 2024-01-22 PROCEDURE — 99283 EMERGENCY DEPT VISIT LOW MDM: CPT

## 2024-01-22 RX ORDER — IBUPROFEN 600 MG/1
600 TABLET ORAL
Status: COMPLETED | OUTPATIENT
Start: 2024-01-22 | End: 2024-01-22

## 2024-01-22 RX ORDER — KETOROLAC TROMETHAMINE 30 MG/ML
15 INJECTION, SOLUTION INTRAMUSCULAR; INTRAVENOUS
Status: DISCONTINUED | OUTPATIENT
Start: 2024-01-22 | End: 2024-01-22

## 2024-01-22 RX ADMIN — IBUPROFEN 600 MG: 600 TABLET ORAL at 10:01

## 2024-01-23 NOTE — DISCHARGE INSTRUCTIONS
For pain take  Ibuprofen every 6 hours as needed  Tylenol = Acetaminophen every 6hrs as needed    You can apply hot or cold packs as needed to the painful area, use them for 15 minutes at a time with breaks in between    Do not take ibuprofen, naproxen, advil, or aleve every day for more than 2-3 weeks without following up with your primary care provider, as they can cause stomach pain and other complications if used every day over long periods of time   DVT ppx: SCDs  GI: Pantoprazole 40 mg daily  Diet: NPO currently, advance as tolerated  ANNE: NS @ 100cc/hour  Bowel: Miraalax and Senna  Dispo: Medically active    Code: FULL

## 2024-01-23 NOTE — ED PROVIDER NOTES
Encounter Date: 2024       History     Chief Complaint   Patient presents with    Shoulder Pain     Left shoulder pain for 1 month. Thinks she may have laid on it wrong. Pt has a hx of sickle cell.      39 y.o. female with sickle cell disease, beta thalassemia, history of avascular necrosis of the hip presents for left shoulder pain for 1 month.  Patient denies any trauma to the shoulder.  She denies history of similar pain.  She reports that this is not typical of her vaso-occlusive crisis pain which is typically in the lower back and legs.  She denies any chest pain, shortness of breath, fatigue, fevers.  She has not needed a blood transfusion in the past year    The history is provided by the patient and medical records.     Review of patient's allergies indicates:   Allergen Reactions    Vicodin [hydrocodone-acetaminophen] Itching    Hydrocodone Rash     Past Medical History:   Diagnosis Date    Avascular necrosis of hip left    Beta 0 thalassemia     Encounter for blood transfusion     Environmental allergies     Sickle-cell disease without crisis      Past Surgical History:   Procedure Laterality Date     SECTION      ROBOT-ASSISTED CHOLECYSTECTOMY N/A 2023    Procedure: ROBOTIC CHOLECYSTECTOMY;  Surgeon: aMik Escobedo MD;  Location: [facility];  Service: General;  Laterality: N/A;     Family History   Family history unknown: Yes     Social History     Tobacco Use    Smoking status: Never    Smokeless tobacco: Never   Substance Use Topics    Alcohol use: No    Drug use: No     Review of Systems   Reason unable to perform ROS: See HPI for relevant ROS.       Physical Exam     Initial Vitals [24]   BP Pulse Resp Temp SpO2   (!) 151/79 72 19 98.8 °F (37.1 °C) 98 %      MAP       --         Physical Exam    Nursing note and vitals reviewed.  Constitutional:   Alert, normal work of breathing, no acute distress   Eyes: Conjunctivae are normal. No scleral icterus.   Cardiovascular:   Normal rate and regular rhythm.           Strong bilateral radial pulses   Pulmonary/Chest: No stridor.   Musculoskeletal:      Comments: RUE: Normal ROM of wrist, shoulder, and digits, no bony tenderness or deformity. No open wounds or skin changes.  LUE: Normal ROM of wrist and digits, no bony tenderness or deformity.  Pain with forced abduction of the left shoulder, mild tenderness along the soft tissue of the left shoulder     Neurological: She is alert.   Mild diminished sensation in the median nerve distribution of the left hand.  Otherwise normal sensation and strength of bilateral upper extremities   Skin: Skin is warm and dry.         ED Course   Procedures  Labs Reviewed   POCT URINE PREGNANCY          Imaging Results              X-Ray Shoulder Trauma Left (In process)                      Medications   ibuprofen tablet 600 mg (has no administration in time range)     Medical Decision Making  39 y.o. female with sickle cell disease, beta thalassemia, history of avascular necrosis of the hip presents for left shoulder pain for 1 month  Differentials include rotator cuff injury, avascular necrosis, ligamentous injury, vaso-occlusive crisis, doubt worsening anemia or acute chest syndrome  On arrival, patient is hemodynamically stable, alert, has no systemic symptoms.  She reports approximately 1 month of left shoulder pain worse with abduction, exam is most consistent with rotator cuff injury.  However, considered avascular necrosis. Distal pulses intact, mild numbness in the median nerve distribution of the left hand, considered carpal tunnel syndrome though patient denies any significant wrist pain.  No evidence of acute spinal cord compression or proximal nerve injury/compression  No evidence of bony fracture or dislocation on x-ray but there is some mild sclerosis of the left humeral head. Will discharge pending radiology read of shoulder x-ray for any convincing evidence of avascular necrosis. Patient  given orthopedics referral for rotator cuff injury versus avascular necrosis of the left shoulder.                                       Clinical Impression:  Final diagnoses:  [M25.512] Left shoulder pain (Primary)  [S46.012A] Rotator cuff strain, left, initial encounter          ED Disposition Condition    Discharge Stable          ED Prescriptions    None       Follow-up Information       Follow up With Specialties Details Why Contact Info Additional Information    [facility] Orthopedics Schedule an appointment as soon as possible for a visit   [address] [place] [address] Dr Hall [address] [place] [phone]     [facility] -  Emergency Medicine  As needed, chest pain, shortness of breath, or for any other concerning symptoms [address] [place] [address] [address] [place] [address] 1st floor             Mahamed Corley MD  01/22/24 6780

## 2024-01-23 NOTE — FIRST PROVIDER EVALUATION
Emergency Department TeleTriage Encounter Note      CHIEF COMPLAINT    Chief Complaint   Patient presents with    Shoulder Pain     Left shoulder pain for 1 month. Thinks she may have laid on it wrong. Pt has a hx of sickle cell.        VITAL SIGNS   Initial Vitals [01/22/24 1942]   BP Pulse Resp Temp SpO2   (!) 151/79 72 19 98.8 °F (37.1 °C) 98 %      MAP       --            ALLERGIES    Review of patient's allergies indicates:   Allergen Reactions    Vicodin [hydrocodone-acetaminophen] Itching    Hydrocodone Rash       PROVIDER TRIAGE NOTE  Patient presents with left shoulder pain worse with rest and improved with movement for 1 month.      ORDERS  Labs Reviewed - No data to display    ED Orders (720h ago, onward)      None              Virtual Visit Note: The provider triage portion of this emergency department evaluation and documentation was performed via [employer], a HIPAA-compliant telemedicine application, in concert with a tele-presenter in the room. A face to face patient evaluation with one of my colleagues will occur once the patient is placed in an emergency department room.      DISCLAIMER: This note was prepared with [employer] voice recognition transcription software. Garbled syntax, mangled pronouns, and other bizarre constructions may be attributed to that software system.

## 2024-03-09 ENCOUNTER — HOSPITAL ENCOUNTER (EMERGENCY)
Facility: HOSPITAL | Age: 39
Discharge: HOME | End: 2024-03-09
Attending: EMERGENCY MEDICINE

## 2024-03-09 VITALS
SYSTOLIC BLOOD PRESSURE: 136 MMHG | OXYGEN SATURATION: 97 % | HEIGHT: 62 IN | BODY MASS INDEX: 25.76 KG/M2 | TEMPERATURE: 100 F | HEART RATE: 123 BPM | DIASTOLIC BLOOD PRESSURE: 80 MMHG | WEIGHT: 140 LBS | RESPIRATION RATE: 16 BRPM

## 2024-03-09 DIAGNOSIS — J02.0 STREP PHARYNGITIS: Primary | ICD-10-CM

## 2024-03-09 LAB
B-HCG UR QL: NEGATIVE
CTP QC/QA: YES
GROUP A STREP, MOLECULAR: POSITIVE
INFLUENZA A, MOLECULAR: NEGATIVE
INFLUENZA B, MOLECULAR: NEGATIVE
SARS-COV-2 RDRP RESP QL NAA+PROBE: NEGATIVE
SPECIMEN SOURCE: NORMAL

## 2024-03-09 PROCEDURE — 25000003 PHARM REV CODE 250: Performed by: PHYSICIAN ASSISTANT

## 2024-03-09 PROCEDURE — 99283 EMERGENCY DEPT VISIT LOW MDM: CPT

## 2024-03-09 PROCEDURE — 87651 STREP A DNA AMP PROBE: CPT | Performed by: PHYSICIAN ASSISTANT

## 2024-03-09 PROCEDURE — 81025 URINE PREGNANCY TEST: CPT | Performed by: PHYSICIAN ASSISTANT

## 2024-03-09 PROCEDURE — U0002 COVID-19 LAB TEST NON-CDC: HCPCS | Performed by: PHYSICIAN ASSISTANT

## 2024-03-09 PROCEDURE — 87502 INFLUENZA DNA AMP PROBE: CPT | Performed by: PHYSICIAN ASSISTANT

## 2024-03-09 RX ORDER — ACETAMINOPHEN 500 MG
1000 TABLET ORAL
Status: COMPLETED | OUTPATIENT
Start: 2024-03-09 | End: 2024-03-09

## 2024-03-09 RX ORDER — AMOXICILLIN 500 MG/1
500 TABLET, FILM COATED ORAL EVERY 12 HOURS
Qty: 20 TABLET | Refills: 0 | Status: SHIPPED | OUTPATIENT
Start: 2024-03-09 | End: 2024-03-19

## 2024-03-09 RX ADMIN — ACETAMINOPHEN 1000 MG: 500 TABLET ORAL at 02:03

## 2024-03-09 NOTE — DISCHARGE INSTRUCTIONS
Rotate tylenol or motrin as needed for pain/fever.  Take antibiotics as prescribed.  Follow up with your primary care provider.  For worsening symptoms, chest pain, shortness of breath, increased abdominal pain, high grade fever, stroke or stroke like symptoms, immediately go to the nearest Emergency Room or call 911 as soon as possible.

## 2024-03-09 NOTE — ED PROVIDER NOTES
Encounter Date: 3/9/2024       History     Chief Complaint   Patient presents with    Headache    Fever    Sore Throat     Started   yesterday      Patient is a 39 year old female who presents with sore throat that started yesterday. Has PMH significant for sickle cell, avascular necrosis and thalassemia. She reports associated headache and fever. She denied N/V/D. She has taken benadryl with no improvement. States sick contact at school.    The history is provided by the patient.     Review of patient's allergies indicates:   Allergen Reactions    Vicodin [hydrocodone-acetaminophen] Itching    Hydrocodone Rash     Past Medical History:   Diagnosis Date    Avascular necrosis of hip left    Beta 0 thalassemia     Encounter for blood transfusion     Environmental allergies     Sickle-cell disease without crisis      Past Surgical History:   Procedure Laterality Date     SECTION      ROBOT-ASSISTED CHOLECYSTECTOMY N/A 2023    Procedure: ROBOTIC CHOLECYSTECTOMY;  Surgeon: Maik Escobedo MD;  Location: [facility];  Service: General;  Laterality: N/A;     Family History   Family history unknown: Yes     Social History     Tobacco Use    Smoking status: Never    Smokeless tobacco: Never   Substance Use Topics    Alcohol use: No    Drug use: No     Review of Systems   Constitutional:  Positive for fever. Negative for activity change, appetite change and fatigue.   HENT:  Positive for sore throat. Negative for congestion and rhinorrhea.    Eyes:  Negative for visual disturbance.   Respiratory:  Positive for cough. Negative for chest tightness, shortness of breath and wheezing.    Cardiovascular:  Negative for chest pain and palpitations.   Gastrointestinal:  Negative for diarrhea, nausea and vomiting.   Genitourinary:  Negative for flank pain.   Musculoskeletal:  Negative for arthralgias, gait problem and myalgias.   Skin:  Negative for rash.   Neurological:  Negative for weakness, light-headedness, numbness  and headaches.   Psychiatric/Behavioral:  Negative for confusion.        Physical Exam     Initial Vitals [03/09/24 1238]   BP Pulse Resp Temp SpO2   136/80 (!) 123 16 99.9 °F (37.7 °C) 97 %      MAP       --         Physical Exam    Nursing note and vitals reviewed.  Constitutional: Vital signs are normal. She appears well-developed and well-nourished. She is cooperative.  Non-toxic appearance. She does not have a sickly appearance.   HENT:   Head: Normocephalic and atraumatic.   Right Ear: Tympanic membrane, external ear and ear canal normal.   Left Ear: Tympanic membrane, external ear and ear canal normal.   Nose: Nose normal.   Mouth/Throat: Uvula is midline.   Bilateral tonsillar swelling with erythema and exudate. Normal phonation. No trismus.    Eyes: Conjunctivae and lids are normal.   Neck: Neck supple.   Normal range of motion.   Full passive range of motion without pain.     Cardiovascular:  Normal rate, regular rhythm and normal heart sounds.     Exam reveals no gallop and no friction rub.       No murmur heard.  Pulmonary/Chest: Breath sounds normal. She has no wheezes. She has no rhonchi. She has no rales.   Musculoskeletal:      Cervical back: Full passive range of motion without pain, normal range of motion and neck supple.     Neurological: She is alert.   Skin: Skin is warm, dry and intact. No rash noted.         ED Course   Procedures  Labs Reviewed   GROUP A STREP, MOLECULAR - Abnormal; Notable for the following components:       Result Value    Group A Strep, Molecular Positive (*)     All other components within normal limits   INFLUENZA A & B BY MOLECULAR   SARS-COV-2 RNA AMPLIFICATION, QUAL   POCT URINE PREGNANCY          Imaging Results    None          Medications   acetaminophen tablet 1,000 mg (1,000 mg Oral Given 3/9/24 1421)     Medical Decision Making  Urgent evaluation of a well appearing 39 year old female who presents with sore throat and fever. Vital signs are stable. Clear and  equal breath sounds bilaterally. Oxygen saturation is stable. I doubt pneumonia. No sign of otitis media. Denied GI complaints. Patient is noted to be COVID negative. Symptomatic treatment at home. Antibody infusion offered if patient meets criteria. Discussed results with patient. Return precautions given. Based on my clinical evaluation, I do not appreciate any immediate, emergent, or life threatening condition or etiology that warrants additional workup today and feel that the patient can be discharged with close follow up care.  Patient is to follow up with their primary care provider. All questions answered.        Amount and/or Complexity of Data Reviewed  External Data Reviewed: labs, radiology and notes.  Labs: ordered.    Risk  OTC drugs.  Prescription drug management.                                      Clinical Impression:  Final diagnoses:  [J02.0] Strep pharyngitis (Primary)          ED Disposition Condition    Discharge Stable          ED Prescriptions       Medication Sig Dispense Start Date End Date Auth. Provider    amoxicillin (AMOXIL) 500 MG Tab Take 1 tablet (500 mg total) by mouth every 12 (twelve) hours. for 10 days 20 tablet 3/9/2024 3/19/2024 Kalani Pisano PA-C          Follow-up Information       Follow up With Specialties Details Why Contact Info Additional Information    Yunier Oliveira MD Internal Medicine   [address] [facility] [address]  [phone]       [facility] -  Emergency Medicine  As needed [address] [place] [address] [address] [place] [phone] 1st floor             Kalani Pisano PA-C  03/09/24 1875

## 2024-11-12 ENCOUNTER — HOSPITAL ENCOUNTER (EMERGENCY)
Facility: HOSPITAL | Age: 39
Discharge: HOME | End: 2024-11-12
Attending: EMERGENCY MEDICINE
Payer: MEDICAID

## 2024-11-12 VITALS
RESPIRATION RATE: 16 BRPM | SYSTOLIC BLOOD PRESSURE: 147 MMHG | TEMPERATURE: 99 F | WEIGHT: 140 LBS | BODY MASS INDEX: 25.76 KG/M2 | HEIGHT: 62 IN | OXYGEN SATURATION: 98 % | HEART RATE: 78 BPM | DIASTOLIC BLOOD PRESSURE: 96 MMHG

## 2024-11-12 DIAGNOSIS — M25.50 POLYARTHRALGIA: ICD-10-CM

## 2024-11-12 DIAGNOSIS — D57.00 SICKLE CELL PAIN CRISIS: Primary | ICD-10-CM

## 2024-11-12 LAB
ALBUMIN SERPL BCP-MCNC: 4.1 G/DL (ref 3.5–5.2)
ALP SERPL-CCNC: 59 U/L (ref 40–150)
ALT SERPL W/O P-5'-P-CCNC: 18 U/L (ref 10–44)
ANION GAP SERPL CALC-SCNC: 9 MMOL/L (ref 8–16)
AST SERPL-CCNC: 21 U/L (ref 10–40)
B-HCG UR QL: NEGATIVE
BASOPHILS NFR BLD: 0 % (ref 0–1.9)
BILIRUB SERPL-MCNC: 0.8 MG/DL (ref 0.1–1)
BUN SERPL-MCNC: 12 MG/DL (ref 6–20)
CALCIUM SERPL-MCNC: 9.5 MG/DL (ref 8.7–10.5)
CHLORIDE SERPL-SCNC: 109 MMOL/L (ref 95–110)
CO2 SERPL-SCNC: 22 MMOL/L (ref 23–29)
CREAT SERPL-MCNC: 0.9 MG/DL (ref 0.5–1.4)
CTP QC/QA: YES
DIFFERENTIAL METHOD BLD: ABNORMAL
EOSINOPHIL NFR BLD: 3 % (ref 0–8)
ERYTHROCYTE [DISTWIDTH] IN BLOOD BY AUTOMATED COUNT: 17.2 % (ref 11.5–14.5)
EST. GFR  (NO RACE VARIABLE): >60 ML/MIN/1.73 M^2
GLUCOSE SERPL-MCNC: 81 MG/DL (ref 70–110)
HCT VFR BLD AUTO: 29.3 % (ref 37–48.5)
HGB BLD-MCNC: 9.7 G/DL (ref 12–16)
IMM GRANULOCYTES # BLD AUTO: ABNORMAL K/UL
IMM GRANULOCYTES NFR BLD AUTO: ABNORMAL %
LYMPHOCYTES NFR BLD: 30 % (ref 18–48)
MCH RBC QN AUTO: 21.4 PG (ref 27–31)
MCHC RBC AUTO-ENTMCNC: 33.1 G/DL (ref 32–36)
MCV RBC AUTO: 65 FL (ref 82–98)
MONOCYTES NFR BLD: 9 % (ref 4–15)
NEUTROPHILS NFR BLD: 58 % (ref 38–73)
NRBC BLD-RTO: 1 /100 WBC
PLATELET # BLD AUTO: 518 K/UL (ref 150–450)
PLATELET BLD QL SMEAR: ABNORMAL
PMV BLD AUTO: 9.2 FL (ref 9.2–12.9)
POTASSIUM SERPL-SCNC: 4.4 MMOL/L (ref 3.5–5.1)
PROT SERPL-MCNC: 7.4 G/DL (ref 6–8.4)
RBC # BLD AUTO: 4.53 M/UL (ref 4–5.4)
RETICS/RBC NFR AUTO: 3.7 % (ref 0.5–2.5)
SODIUM SERPL-SCNC: 140 MMOL/L (ref 136–145)
WBC # BLD AUTO: 15.42 K/UL (ref 3.9–12.7)

## 2024-11-12 PROCEDURE — 25000003 PHARM REV CODE 250: Performed by: EMERGENCY MEDICINE

## 2024-11-12 PROCEDURE — 81025 URINE PREGNANCY TEST: CPT | Performed by: EMERGENCY MEDICINE

## 2024-11-12 PROCEDURE — 80053 COMPREHEN METABOLIC PANEL: CPT | Performed by: EMERGENCY MEDICINE

## 2024-11-12 PROCEDURE — 85027 COMPLETE CBC AUTOMATED: CPT | Performed by: EMERGENCY MEDICINE

## 2024-11-12 PROCEDURE — 96374 THER/PROPH/DIAG INJ IV PUSH: CPT

## 2024-11-12 PROCEDURE — 36415 COLL VENOUS BLD VENIPUNCTURE: CPT | Performed by: EMERGENCY MEDICINE

## 2024-11-12 PROCEDURE — 63600175 PHARM REV CODE 636 W HCPCS: Performed by: EMERGENCY MEDICINE

## 2024-11-12 PROCEDURE — 99284 EMERGENCY DEPT VISIT MOD MDM: CPT | Mod: 25

## 2024-11-12 PROCEDURE — 85045 AUTOMATED RETICULOCYTE COUNT: CPT | Performed by: EMERGENCY MEDICINE

## 2024-11-12 PROCEDURE — 85007 BL SMEAR W/DIFF WBC COUNT: CPT | Performed by: EMERGENCY MEDICINE

## 2024-11-12 RX ORDER — MORPHINE SULFATE 2 MG/ML
6 INJECTION, SOLUTION INTRAMUSCULAR; INTRAVENOUS
Status: COMPLETED | OUTPATIENT
Start: 2024-11-12 | End: 2024-11-12

## 2024-11-12 RX ORDER — ONDANSETRON 4 MG/1
4 TABLET, ORALLY DISINTEGRATING ORAL
Status: COMPLETED | OUTPATIENT
Start: 2024-11-12 | End: 2024-11-12

## 2024-11-12 RX ADMIN — MORPHINE SULFATE 6 MG: 2 INJECTION, SOLUTION INTRAMUSCULAR; INTRAVENOUS at 08:11

## 2024-11-12 RX ADMIN — ONDANSETRON 4 MG: 4 TABLET, ORALLY DISINTEGRATING ORAL at 09:11

## 2024-11-13 NOTE — ED PROVIDER NOTES
Chief complaint:  Joint Pain (Pt having pain in her joints for about one week.  )      HPI:  Shi Saba is a 39 y.o. female with hx SSD, AVN L hip, chronic pain on MS-Contin presenting with several days of multiple areas of Zeke, joint pain similar to prior sickle cell crisis.  Patient relates pain in the right shoulder as well as the right lumbar back region.  She denies fall or injury.  There is no new numbness or weakness.  There is no pain with movement.  She is walking without difficulty.  There is no change in bowel or bladder habits.  No recent fever.  No associated abdominal pain.  She has taken acetaminophen for pain but has not taken her MS Contin.    ROS: As per HPI and below:  No peripheral swelling, fever, rash, joint swelling, chest pain, dyspnea, hemoptysis.    Review of patient's allergies indicates:   Allergen Reactions    Vicodin [hydrocodone-acetaminophen] Itching    Hydrocodone Rash       Discharge Medication List as of 11/12/2024  8:40 PM        CONTINUE these medications which have NOT CHANGED    Details   cetirizine (ZYRTEC) 10 MG tablet Take 10 mg by mouth once daily., Historical Med      ferrous gluconate (FERGON) 324 MG tablet TK 1 T PO BID, Historical Med      fluticasone (FLONASE) 50 mcg/actuation nasal spray 1 spray by Each Nare route once daily., Historical Med      fluticasone (FLOVENT HFA) 110 mcg/actuation inhaler Inhale 1 puff into the lungs 2 (two) times daily. Controller, Historical Med      folic acid (FOLVITE) 1 MG tablet Take 1 mg by mouth once daily., Until Discontinued, Historical Med      morphine (MS CONTIN) 15 MG 12 hr tablet Take 15 mg by mouth 2 (two) times daily., Historical Med      pantoprazole (PROTONIX) 40 MG tablet Take 1 tablet (40 mg total) by mouth once daily., Starting Sun 3/19/2023, Until Mon 3/18/2024, Print             PMH:  As per HPI and below:  Past Medical History:   Diagnosis Date    Avascular necrosis of hip left    Beta 0 thalassemia      Encounter for blood transfusion     Environmental allergies     Sickle-cell disease without crisis      Past Surgical History:   Procedure Laterality Date     SECTION      ROBOT-ASSISTED CHOLECYSTECTOMY N/A 2023    Procedure: ROBOTIC CHOLECYSTECTOMY;  Surgeon: Maik Escobedo MD;  Location: [facility];  Service: General;  Laterality: N/A;       Social History     Socioeconomic History    Marital status:    Tobacco Use    Smoking status: Never    Smokeless tobacco: Never   Substance and Sexual Activity    Alcohol use: No    Drug use: No    Sexual activity: Yes     Social Drivers of Health     Financial Resource Strain: Low Risk  (2024)    Received from [facility] [employer]    Overall Financial Resource Strain (CARDIA)     Difficulty of Paying Living Expenses: Not very hard   Food Insecurity: No Food Insecurity (2024)    Received from [facility]    Hunger Vital Sign     Worried About Running Out of Food in the Last Year: Never true     Ran Out of Food in the Last Year: Never true   Transportation Needs: No Transportation Needs (2024)    Received from [facility] [employer] [facility] [employer]    PRAPARE - Transportation     Lack of Transportation (Medical): No     Lack of Transportation (Non-Medical): No   Physical Activity: Insufficiently Active (2024)    Received from [facility] [employer] [facility] [employer]    Exercise Vital Sign     Days of Exercise per Week: 1 day     Minutes of Exercise per Session: 30 min   Stress: No Stress Concern Present (2024)    Received from [facility] [employer]    [employer] - Occupational Stress Questionnaire     Feeling of Stress : Only a little   Housing Stability: High Risk (2024)    Received from [facility] [employer] [facility]    Housing Stability Vital Sign     Unable to Pay for Housing in the Last Year: Yes     Number of Places Lived in the Last Year: 1     Unstable Housing in the Last Year: No       Family History    Family history unknown: Yes       Physical Exam:    Vitals:    11/12/24 2130   BP: (!) 147/96   Pulse: 78   Resp: 16   Temp:      GENERAL:  No apparent distress.  Alert.    HEENT:  Moist mucous membranes.  Normocephalic and atraumatic.    NECK:  No swelling.  Midline trachea.   CARDIOVASCULAR:  Regular rate and rhythm.  2+ radial pulses.  No murmur.  PULMONARY:  Lungs clear to auscultation bilaterally.  No wheezes, rales, or rhonci.  Unlabored respirations.  ABDOMEN:  Non-tender and non-distended.    EXTREMITIES:  Warm and well perfused.  Brisk capillary refill.  No reproducible extremity tenderness to palpation, edema, joint effusion, restriction to full active range of motion in the upper and lower extremities.  No erythema or increased warmth.  2+ DP and PT pulses.  Upper and lower extremities are symmetric and nontender to palpation.  NEUROLOGICAL:  Normal mental status.  Appropriate and conversant.  5/5 strength and sensation in the upper and lower extremities.  SKIN:  No rashes or ecchymoses.    BACK:  Atraumatic.  No CVA tenderness to palpation.  No midline vertebral tenderness.    Labs Reviewed   CBC W/ AUTO DIFFERENTIAL - Abnormal       Result Value    WBC 15.42 (*)     RBC 4.53      Hemoglobin 9.7 (*)     Hematocrit 29.3 (*)     MCV 65 (*)     MCH 21.4 (*)     MCHC 33.1      RDW 17.2 (*)     Platelets 518 (*)     MPV 9.2      Immature Granulocytes CANCELED      Immature Grans (Abs) CANCELED      nRBC 1 (*)     Gran % 58.0      Lymph % 30.0      Mono % 9.0      Eosinophil % 3.0      Basophil % 0.0      Platelet Estimate Appears normal      Differential Method Manual     COMPREHENSIVE METABOLIC PANEL - Abnormal    Sodium 140      Potassium 4.4      Chloride 109      CO2 22 (*)     Glucose 81      BUN 12      Creatinine 0.9      Calcium 9.5      Total Protein 7.4      Albumin 4.1      Total Bilirubin 0.8      Alkaline Phosphatase 59      AST 21      ALT 18      eGFR >60      Anion Gap 9     RETICULOCYTES -  Abnormal    Retic 3.7 (*)    POCT URINE PREGNANCY    POC Preg Test, Ur Negative       Acceptable Yes         Discharge Medication List as of 11/12/2024  8:40 PM        CONTINUE these medications which have NOT CHANGED    Details   cetirizine (ZYRTEC) 10 MG tablet Take 10 mg by mouth once daily., Historical Med      ferrous gluconate (FERGON) 324 MG tablet TK 1 T PO BID, Historical Med      fluticasone (FLONASE) 50 mcg/actuation nasal spray 1 spray by Each Nare route once daily., Historical Med      fluticasone (FLOVENT HFA) 110 mcg/actuation inhaler Inhale 1 puff into the lungs 2 (two) times daily. Controller, Historical Med      folic acid (FOLVITE) 1 MG tablet Take 1 mg by mouth once daily., Until Discontinued, Historical Med      morphine (MS CONTIN) 15 MG 12 hr tablet Take 15 mg by mouth 2 (two) times daily., Historical Med      pantoprazole (PROTONIX) 40 MG tablet Take 1 tablet (40 mg total) by mouth once daily., Starting Sun 3/19/2023, Until Mon 3/18/2024, Print             Orders Placed This Encounter   Procedures    Complete Blood Count (CBC)    Comprehensive Metabolic Panel (CMP)    Reticulocytes    POCT urine pregnancy    Insert Saline lock IV       Imaging Results    None         ED Course as of 11/12/24 2140 Tue Nov 12, 2024 2040 Patient reports pain is well controlled with single dose of morphine and she wishes to go home. [MR]      ED Course User Index  [MR] Boston Patel MD       MDM:    39 y.o. female with multiple areas of joint pain suggestive of acute sickle cell crisis.  Patient has had prior history of the same.  There is no appearance of other life-threatening joint process such as septic joint.  I doubt pathologic fracture.  Morphine IV ordered for pain with lab sent to assess hemoglobin as well as appropriate reticulocytosis.  There is no sign of other emergency such as acute arterial large vessel occlusive disease with excellent distal pulses.  There is no sign  of other emergent back process such as cauda equinus syndrome.  I do not think further back imaging is indicated.  I doubt DVT.  Pain is well relieved with one dose of morphine here and she declines admission.  She prefers to follow up with hematologist on a close outpatient basis, which I feel is reasonable.  There is appropriate reticulocytosis with stable anemia.  There is mild nonspecific leukocytosis reviewed with patient that may be rechecked on follow-up.  Return precautions reviewed.    Diagnoses:    1. Sickle cell pain crisis   2.  Polyarthralgia       Boston Patel MD  11/12/24 1786

## 2024-11-19 ENCOUNTER — PATIENT MESSAGE (OUTPATIENT)
Dept: RESEARCH | Facility: HOSPITAL | Age: 39
End: 2024-11-19
Payer: MEDICAID

## 2025-08-14 ENCOUNTER — HOSPITAL ENCOUNTER (EMERGENCY)
Facility: HOSPITAL | Age: 40
Discharge: HOME | End: 2025-08-15
Attending: STUDENT IN AN ORGANIZED HEALTH CARE EDUCATION/TRAINING PROGRAM
Payer: MEDICAID

## 2025-08-14 DIAGNOSIS — R51.9 ACUTE NONINTRACTABLE HEADACHE, UNSPECIFIED HEADACHE TYPE: ICD-10-CM

## 2025-08-14 DIAGNOSIS — R07.9 CHEST PAIN: ICD-10-CM

## 2025-08-14 DIAGNOSIS — D57.00 SICKLE CELL PAIN CRISIS: Primary | ICD-10-CM

## 2025-08-14 LAB
ABSOLUTE EOSINOPHIL (SMH): 0.12 K/UL
ABSOLUTE MONOCYTE (SMH): 0.94 K/UL (ref 0.3–1)
ABSOLUTE NEUTROPHIL COUNT (SMH): 11 K/UL (ref 1.8–7.7)
ALBUMIN SERPL-MCNC: 4.4 G/DL (ref 3.5–5.2)
ALP SERPL-CCNC: 63 UNIT/L (ref 40–150)
ALT SERPL-CCNC: 13 UNIT/L (ref 10–44)
ANION GAP (SMH): 10 MMOL/L (ref 8–16)
AST SERPL-CCNC: 23 UNIT/L (ref 11–45)
BASOPHILS # BLD AUTO: 0.1 K/UL
BASOPHILS NFR BLD AUTO: 0.6 %
BILIRUB SERPL-MCNC: 1.3 MG/DL (ref 0.1–1)
BUN SERPL-MCNC: 10 MG/DL (ref 6–20)
CALCIUM SERPL-MCNC: 9.3 MG/DL (ref 8.7–10.5)
CHLORIDE SERPL-SCNC: 109 MMOL/L (ref 95–110)
CO2 SERPL-SCNC: 24 MMOL/L (ref 23–29)
CREAT SERPL-MCNC: 0.8 MG/DL (ref 0.5–1.4)
ERYTHROCYTE [DISTWIDTH] IN BLOOD BY AUTOMATED COUNT: 17.8 % (ref 11.5–14.5)
GFR SERPLBLD CREATININE-BSD FMLA CKD-EPI: >60 ML/MIN/1.73/M2
GLUCOSE SERPL-MCNC: 77 MG/DL (ref 70–110)
HCT VFR BLD AUTO: 29 % (ref 37–48.5)
HCV AB SERPL QL IA: NORMAL
HGB BLD-MCNC: 9.7 GM/DL (ref 12–16)
HIV 1+2 AB+HIV1 P24 AG SERPL QL IA: NORMAL
IMM GRANULOCYTES # BLD AUTO: 0.06 K/UL (ref 0–0.04)
IMM GRANULOCYTES NFR BLD AUTO: 0.4 % (ref 0–0.5)
LYMPHOCYTES # BLD AUTO: 3.8 K/UL (ref 1–4.8)
MCH RBC QN AUTO: 21.4 PG (ref 27–31)
MCHC RBC AUTO-ENTMCNC: 33.4 G/DL (ref 32–36)
MCV RBC AUTO: 64 FL (ref 82–98)
NT-PROBNP SERPL-MCNC: 110 PG/ML
NUCLEATED RBC (/100WBC) (SMH): 1 /100 WBC
PLATELET # BLD AUTO: 461 K/UL (ref 150–450)
PMV BLD AUTO: 9.3 FL (ref 9.2–12.9)
POTASSIUM SERPL-SCNC: 3.6 MMOL/L (ref 3.5–5.1)
PROT SERPL-MCNC: 7.6 GM/DL (ref 6–8.4)
RBC # BLD AUTO: 4.53 M/UL (ref 4–5.4)
RELATIVE EOSINOPHIL (SMH): 0.8 % (ref 0–8)
RELATIVE LYMPHOCYTE (SMH): 23.8 % (ref 18–48)
RELATIVE MONOCYTE (SMH): 5.9 % (ref 4–15)
RELATIVE NEUTROPHIL (SMH): 68.5 % (ref 38–73)
RETICS/RBC NFR AUTO: 4.2 % (ref 0.5–2.5)
SODIUM SERPL-SCNC: 143 MMOL/L (ref 136–145)
TROPONIN I SERPL HS-MCNC: <3 NG/L
WBC # BLD AUTO: 15.99 K/UL (ref 3.9–12.7)

## 2025-08-14 PROCEDURE — U0002 COVID-19 LAB TEST NON-CDC: HCPCS | Performed by: STUDENT IN AN ORGANIZED HEALTH CARE EDUCATION/TRAINING PROGRAM

## 2025-08-14 PROCEDURE — 87502 INFLUENZA DNA AMP PROBE: CPT | Performed by: STUDENT IN AN ORGANIZED HEALTH CARE EDUCATION/TRAINING PROGRAM

## 2025-08-14 PROCEDURE — 83880 ASSAY OF NATRIURETIC PEPTIDE: CPT | Performed by: STUDENT IN AN ORGANIZED HEALTH CARE EDUCATION/TRAINING PROGRAM

## 2025-08-14 PROCEDURE — 81025 URINE PREGNANCY TEST: CPT | Performed by: STUDENT IN AN ORGANIZED HEALTH CARE EDUCATION/TRAINING PROGRAM

## 2025-08-14 PROCEDURE — 93010 ELECTROCARDIOGRAM REPORT: CPT | Mod: ,,, | Performed by: INTERNAL MEDICINE

## 2025-08-14 PROCEDURE — 93005 ELECTROCARDIOGRAM TRACING: CPT

## 2025-08-14 PROCEDURE — 85045 AUTOMATED RETICULOCYTE COUNT: CPT | Performed by: PHYSICIAN ASSISTANT

## 2025-08-14 PROCEDURE — 86803 HEPATITIS C AB TEST: CPT | Performed by: STUDENT IN AN ORGANIZED HEALTH CARE EDUCATION/TRAINING PROGRAM

## 2025-08-14 PROCEDURE — 87389 HIV-1 AG W/HIV-1&-2 AB AG IA: CPT | Performed by: STUDENT IN AN ORGANIZED HEALTH CARE EDUCATION/TRAINING PROGRAM

## 2025-08-14 PROCEDURE — 87651 STREP A DNA AMP PROBE: CPT | Performed by: STUDENT IN AN ORGANIZED HEALTH CARE EDUCATION/TRAINING PROGRAM

## 2025-08-14 PROCEDURE — 85025 COMPLETE CBC W/AUTO DIFF WBC: CPT | Performed by: PHYSICIAN ASSISTANT

## 2025-08-14 PROCEDURE — 84484 ASSAY OF TROPONIN QUANT: CPT | Performed by: STUDENT IN AN ORGANIZED HEALTH CARE EDUCATION/TRAINING PROGRAM

## 2025-08-14 PROCEDURE — 36415 COLL VENOUS BLD VENIPUNCTURE: CPT | Performed by: STUDENT IN AN ORGANIZED HEALTH CARE EDUCATION/TRAINING PROGRAM

## 2025-08-14 PROCEDURE — 82435 ASSAY OF BLOOD CHLORIDE: CPT | Performed by: PHYSICIAN ASSISTANT

## 2025-08-14 PROCEDURE — 81003 URINALYSIS AUTO W/O SCOPE: CPT | Performed by: PHYSICIAN ASSISTANT

## 2025-08-14 PROCEDURE — 99285 EMERGENCY DEPT VISIT HI MDM: CPT | Mod: 25

## 2025-08-14 RX ORDER — ONDANSETRON HYDROCHLORIDE 2 MG/ML
4 INJECTION, SOLUTION INTRAVENOUS
Status: COMPLETED | OUTPATIENT
Start: 2025-08-14 | End: 2025-08-15

## 2025-08-14 RX ORDER — MORPHINE SULFATE 4 MG/ML
4 INJECTION, SOLUTION INTRAMUSCULAR; INTRAVENOUS
Refills: 0 | Status: COMPLETED | OUTPATIENT
Start: 2025-08-14 | End: 2025-08-15

## 2025-08-15 VITALS
HEART RATE: 62 BPM | TEMPERATURE: 98 F | RESPIRATION RATE: 16 BRPM | BODY MASS INDEX: 27.6 KG/M2 | SYSTOLIC BLOOD PRESSURE: 153 MMHG | HEIGHT: 62 IN | DIASTOLIC BLOOD PRESSURE: 101 MMHG | WEIGHT: 150 LBS | OXYGEN SATURATION: 100 %

## 2025-08-15 LAB
B-HCG UR QL: NEGATIVE
BILIRUB UR QL STRIP.AUTO: NEGATIVE
CLARITY UR: CLEAR
COLOR UR AUTO: YELLOW
CTP QC/QA: YES
GLUCOSE UR QL STRIP: NEGATIVE
GROUP A STREP MOLECULAR (OHS): NEGATIVE
HGB UR QL STRIP: NEGATIVE
INFLUENZA A MOLECULAR (OHS): NEGATIVE
INFLUENZA B MOLECULAR (OHS): NEGATIVE
KETONES UR QL STRIP: ABNORMAL
LEUKOCYTE ESTERASE UR QL STRIP: NEGATIVE
NITRITE UR QL STRIP: NEGATIVE
PH UR STRIP: 6 [PH]
PROT UR QL STRIP: NEGATIVE
SARS-COV-2 RDRP RESP QL NAA+PROBE: NEGATIVE
SP GR UR STRIP: 1.02
TROPONIN I SERPL HS-MCNC: <3 NG/L
UROBILINOGEN UR STRIP-ACNC: NEGATIVE EU/DL

## 2025-08-15 PROCEDURE — 36415 COLL VENOUS BLD VENIPUNCTURE: CPT | Performed by: STUDENT IN AN ORGANIZED HEALTH CARE EDUCATION/TRAINING PROGRAM

## 2025-08-15 PROCEDURE — 84484 ASSAY OF TROPONIN QUANT: CPT | Performed by: STUDENT IN AN ORGANIZED HEALTH CARE EDUCATION/TRAINING PROGRAM

## 2025-08-15 PROCEDURE — 63600175 PHARM REV CODE 636 W HCPCS: Performed by: STUDENT IN AN ORGANIZED HEALTH CARE EDUCATION/TRAINING PROGRAM

## 2025-08-15 PROCEDURE — 25000003 PHARM REV CODE 250: Performed by: STUDENT IN AN ORGANIZED HEALTH CARE EDUCATION/TRAINING PROGRAM

## 2025-08-15 PROCEDURE — 96374 THER/PROPH/DIAG INJ IV PUSH: CPT

## 2025-08-15 PROCEDURE — 96361 HYDRATE IV INFUSION ADD-ON: CPT

## 2025-08-15 PROCEDURE — 96375 TX/PRO/DX INJ NEW DRUG ADDON: CPT

## 2025-08-15 RX ORDER — OXYCODONE AND ACETAMINOPHEN 5; 325 MG/1; MG/1
1 TABLET ORAL
Refills: 0 | Status: COMPLETED | OUTPATIENT
Start: 2025-08-15 | End: 2025-08-15

## 2025-08-15 RX ADMIN — OXYCODONE HYDROCHLORIDE AND ACETAMINOPHEN 1 TABLET: 5; 325 TABLET ORAL at 01:08

## 2025-08-15 RX ADMIN — MORPHINE SULFATE 4 MG: 4 INJECTION, SOLUTION INTRAMUSCULAR; INTRAVENOUS at 12:08

## 2025-08-15 RX ADMIN — ONDANSETRON 4 MG: 2 INJECTION INTRAMUSCULAR; INTRAVENOUS at 12:08

## 2025-08-15 RX ADMIN — SODIUM CHLORIDE, POTASSIUM CHLORIDE, SODIUM LACTATE AND CALCIUM CHLORIDE 1000 ML: 600; 310; 30; 20 INJECTION, SOLUTION INTRAVENOUS at 12:08

## 2025-08-16 LAB
OHS QRS DURATION: 86 MS
OHS QTC CALCULATION: 440 MS

## 2025-08-17 LAB — HOLD SPECIMEN: NORMAL

## 2025-08-25 PROCEDURE — 85045 AUTOMATED RETICULOCYTE COUNT: CPT | Performed by: PHYSICIAN ASSISTANT

## 2025-08-25 PROCEDURE — 85025 COMPLETE CBC W/AUTO DIFF WBC: CPT | Performed by: PHYSICIAN ASSISTANT

## 2025-08-25 PROCEDURE — 36415 COLL VENOUS BLD VENIPUNCTURE: CPT | Performed by: PHYSICIAN ASSISTANT

## 2025-08-25 PROCEDURE — 99284 EMERGENCY DEPT VISIT MOD MDM: CPT

## 2025-08-25 PROCEDURE — 80053 COMPREHEN METABOLIC PANEL: CPT | Performed by: PHYSICIAN ASSISTANT

## 2025-08-26 ENCOUNTER — HOSPITAL ENCOUNTER (EMERGENCY)
Facility: HOSPITAL | Age: 40
Discharge: HOME | End: 2025-08-26
Attending: EMERGENCY MEDICINE
Payer: MEDICAID

## 2025-08-26 VITALS
RESPIRATION RATE: 16 BRPM | WEIGHT: 150 LBS | SYSTOLIC BLOOD PRESSURE: 147 MMHG | OXYGEN SATURATION: 99 % | HEART RATE: 63 BPM | DIASTOLIC BLOOD PRESSURE: 90 MMHG | BODY MASS INDEX: 27.6 KG/M2 | TEMPERATURE: 99 F | HEIGHT: 62 IN

## 2025-08-26 DIAGNOSIS — D57.00 SICKLE CELL DISEASE WITH CRISIS: Primary | ICD-10-CM

## 2025-08-26 LAB
ABSOLUTE EOSINOPHIL (SMH): 0.17 K/UL
ABSOLUTE MONOCYTE (SMH): 0.89 K/UL (ref 0.3–1)
ABSOLUTE NEUTROPHIL COUNT (SMH): 7 K/UL (ref 1.8–7.7)
ALBUMIN SERPL-MCNC: 4.3 G/DL (ref 3.5–5.2)
ALP SERPL-CCNC: 57 UNIT/L (ref 40–150)
ALT SERPL-CCNC: 11 UNIT/L (ref 10–44)
ANION GAP (SMH): 8 MMOL/L (ref 8–16)
ANISOCYTOSIS BLD QL SMEAR: SLIGHT
AST SERPL-CCNC: 25 UNIT/L (ref 11–45)
BASOPHILS # BLD AUTO: 0.11 K/UL
BASOPHILS NFR BLD AUTO: 0.9 %
BILIRUB SERPL-MCNC: 0.7 MG/DL (ref 0.1–1)
BUN SERPL-MCNC: 9 MG/DL (ref 6–20)
CALCIUM SERPL-MCNC: 9.1 MG/DL (ref 8.7–10.5)
CHLORIDE SERPL-SCNC: 110 MMOL/L (ref 95–110)
CO2 SERPL-SCNC: 24 MMOL/L (ref 23–29)
CREAT SERPL-MCNC: 1.1 MG/DL (ref 0.5–1.4)
DACRYOCYTES BLD QL SMEAR: NORMAL
ERYTHROCYTE [DISTWIDTH] IN BLOOD BY AUTOMATED COUNT: 17 % (ref 11.5–14.5)
GFR SERPLBLD CREATININE-BSD FMLA CKD-EPI: >60 ML/MIN/1.73/M2
GLUCOSE SERPL-MCNC: 93 MG/DL (ref 70–110)
HCT VFR BLD AUTO: 29.7 % (ref 37–48.5)
HGB BLD-MCNC: 10 GM/DL (ref 12–16)
HYPOCHROMIA BLD QL SMEAR: NORMAL
IMM GRANULOCYTES # BLD AUTO: 0.05 K/UL (ref 0–0.04)
IMM GRANULOCYTES NFR BLD AUTO: 0.4 % (ref 0–0.5)
LYMPHOCYTES # BLD AUTO: 4.55 K/UL (ref 1–4.8)
MCH RBC QN AUTO: 21.9 PG (ref 27–31)
MCHC RBC AUTO-ENTMCNC: 33.7 G/DL (ref 32–36)
MCV RBC AUTO: 65 FL (ref 82–98)
NUCLEATED RBC (/100WBC) (SMH): 1 /100 WBC
OVALOCYTES BLD QL SMEAR: NORMAL
PLATELET # BLD AUTO: 451 K/UL (ref 150–450)
PMV BLD AUTO: 9.8 FL (ref 9.2–12.9)
POIKILOCYTOSIS BLD QL SMEAR: NORMAL
POTASSIUM SERPL-SCNC: 4.1 MMOL/L (ref 3.5–5.1)
PROT SERPL-MCNC: 7.4 GM/DL (ref 6–8.4)
RBC # BLD AUTO: 4.57 M/UL (ref 4–5.4)
RELATIVE EOSINOPHIL (SMH): 1.3 % (ref 0–8)
RELATIVE LYMPHOCYTE (SMH): 35.7 % (ref 18–48)
RELATIVE MONOCYTE (SMH): 7 % (ref 4–15)
RELATIVE NEUTROPHIL (SMH): 54.7 % (ref 38–73)
RETICS/RBC NFR AUTO: 3.6 % (ref 0.5–2.5)
SCHISTOCYTES BLD QL SMEAR: NORMAL
SCHISTOCYTES BLD QL SMEAR: PRESENT
SICKLE CELLS BLD QL SMEAR: NORMAL
SODIUM SERPL-SCNC: 142 MMOL/L (ref 136–145)
TARGETS BLD QL SMEAR: NORMAL
WBC # BLD AUTO: 12.76 K/UL (ref 3.9–12.7)

## 2025-08-26 PROCEDURE — 96375 TX/PRO/DX INJ NEW DRUG ADDON: CPT

## 2025-08-26 PROCEDURE — 63600175 PHARM REV CODE 636 W HCPCS: Mod: JZ,TB | Performed by: EMERGENCY MEDICINE

## 2025-08-26 PROCEDURE — 25000003 PHARM REV CODE 250: Performed by: EMERGENCY MEDICINE

## 2025-08-26 PROCEDURE — 96374 THER/PROPH/DIAG INJ IV PUSH: CPT

## 2025-08-26 PROCEDURE — 96361 HYDRATE IV INFUSION ADD-ON: CPT

## 2025-08-26 PROCEDURE — 25000003 PHARM REV CODE 250: Performed by: PHYSICIAN ASSISTANT

## 2025-08-26 RX ORDER — KETOROLAC TROMETHAMINE 30 MG/ML
15 INJECTION, SOLUTION INTRAMUSCULAR; INTRAVENOUS
Status: COMPLETED | OUTPATIENT
Start: 2025-08-26 | End: 2025-08-26

## 2025-08-26 RX ORDER — MORPHINE SULFATE 4 MG/ML
4 INJECTION, SOLUTION INTRAMUSCULAR; INTRAVENOUS
Refills: 0 | Status: COMPLETED | OUTPATIENT
Start: 2025-08-26 | End: 2025-08-26

## 2025-08-26 RX ORDER — ONDANSETRON HYDROCHLORIDE 2 MG/ML
4 INJECTION, SOLUTION INTRAVENOUS
Status: COMPLETED | OUTPATIENT
Start: 2025-08-26 | End: 2025-08-26

## 2025-08-26 RX ADMIN — MORPHINE SULFATE 4 MG: 4 INJECTION INTRAVENOUS at 03:08

## 2025-08-26 RX ADMIN — SODIUM CHLORIDE 1000 ML: 9 INJECTION, SOLUTION INTRAVENOUS at 01:08

## 2025-08-26 RX ADMIN — ONDANSETRON 4 MG: 2 INJECTION INTRAMUSCULAR; INTRAVENOUS at 04:08

## 2025-08-26 RX ADMIN — SODIUM CHLORIDE 1000 ML: 9 INJECTION, SOLUTION INTRAVENOUS at 03:08

## 2025-08-26 RX ADMIN — KETOROLAC TROMETHAMINE 15 MG: 30 INJECTION, SOLUTION INTRAMUSCULAR; INTRAVENOUS at 05:08

## 2025-08-31 ENCOUNTER — HOSPITAL ENCOUNTER (EMERGENCY)
Facility: HOSPITAL | Age: 40
Discharge: HOME | End: 2025-09-01
Attending: STUDENT IN AN ORGANIZED HEALTH CARE EDUCATION/TRAINING PROGRAM
Payer: MEDICAID

## 2025-08-31 DIAGNOSIS — R20.2 NUMBNESS AND TINGLING IN LEFT ARM: Primary | ICD-10-CM

## 2025-08-31 DIAGNOSIS — D57.1 SICKLE CELL ANEMIA: ICD-10-CM

## 2025-08-31 DIAGNOSIS — M25.519 SHOULDER PAIN: ICD-10-CM

## 2025-08-31 DIAGNOSIS — R20.0 NUMBNESS AND TINGLING IN LEFT ARM: Primary | ICD-10-CM

## 2025-08-31 PROCEDURE — 99285 EMERGENCY DEPT VISIT HI MDM: CPT

## 2025-09-01 VITALS
OXYGEN SATURATION: 100 % | BODY MASS INDEX: 27.6 KG/M2 | SYSTOLIC BLOOD PRESSURE: 124 MMHG | TEMPERATURE: 98 F | DIASTOLIC BLOOD PRESSURE: 80 MMHG | WEIGHT: 150 LBS | HEART RATE: 69 BPM | HEIGHT: 62 IN | RESPIRATION RATE: 17 BRPM

## 2025-09-01 LAB
ALBUMIN SERPL-MCNC: 4.4 G/DL (ref 3.5–5.2)
ALP SERPL-CCNC: 59 UNIT/L (ref 40–150)
ALT SERPL-CCNC: 22 UNIT/L (ref 10–44)
ANION GAP (SMH): 10 MMOL/L (ref 8–16)
ANISOCYTOSIS BLD QL SMEAR: SLIGHT
AST SERPL-CCNC: 32 UNIT/L (ref 11–45)
B-HCG UR QL: NEGATIVE
BACTERIA #/AREA URNS AUTO: ABNORMAL /HPF
BILIRUB SERPL-MCNC: 0.9 MG/DL (ref 0.1–1)
BILIRUB UR QL STRIP.AUTO: NEGATIVE
BUN SERPL-MCNC: 11 MG/DL (ref 6–20)
CALCIUM SERPL-MCNC: 9.4 MG/DL (ref 8.7–10.5)
CHLORIDE SERPL-SCNC: 108 MMOL/L (ref 95–110)
CLARITY UR: CLEAR
CO2 SERPL-SCNC: 23 MMOL/L (ref 23–29)
COLOR UR AUTO: COLORLESS
CREAT SERPL-MCNC: 1 MG/DL (ref 0.5–1.4)
CTP QC/QA: YES
EOSINOPHIL NFR BLD MANUAL: 2 % (ref 0–8)
ERYTHROCYTE [DISTWIDTH] IN BLOOD BY AUTOMATED COUNT: 16.7 % (ref 11.5–14.5)
GFR SERPLBLD CREATININE-BSD FMLA CKD-EPI: >60 ML/MIN/1.73/M2
GLUCOSE SERPL-MCNC: 90 MG/DL (ref 70–110)
GLUCOSE UR QL STRIP: NEGATIVE
HCT VFR BLD AUTO: 29.2 % (ref 37–48.5)
HGB BLD-MCNC: 9.9 GM/DL (ref 12–16)
HGB UR QL STRIP: ABNORMAL
KETONES UR QL STRIP: NEGATIVE
LEUKOCYTE ESTERASE UR QL STRIP: NEGATIVE
LYMPHOCYTES NFR BLD MANUAL: 37 % (ref 18–48)
MCH RBC QN AUTO: 21.9 PG (ref 27–31)
MCHC RBC AUTO-ENTMCNC: 33.9 G/DL (ref 32–36)
MCV RBC AUTO: 65 FL (ref 82–98)
MICROSCOPIC COMMENT: ABNORMAL
MONOCYTES NFR BLD MANUAL: 5 % (ref 4–15)
NEUTROPHILS NFR BLD MANUAL: 55 % (ref 38–73)
NEUTS BAND NFR BLD MANUAL: 1 %
NITRITE UR QL STRIP: NEGATIVE
NUCLEATED RBC (/100WBC) (SMH): 1 /100 WBC
OHS QRS DURATION: 76 MS
OHS QTC CALCULATION: 447 MS
PH UR STRIP: 7 [PH]
PLATELET # BLD AUTO: 501 K/UL (ref 150–450)
PLATELET BLD QL SMEAR: NORMAL
PMV BLD AUTO: 9.5 FL (ref 9.2–12.9)
POTASSIUM SERPL-SCNC: 3.7 MMOL/L (ref 3.5–5.1)
PROT SERPL-MCNC: 7.4 GM/DL (ref 6–8.4)
PROT UR QL STRIP: NEGATIVE
RBC # BLD AUTO: 4.53 M/UL (ref 4–5.4)
RBC #/AREA URNS AUTO: 18 /HPF
RETICS/RBC NFR AUTO: 3.4 % (ref 0.5–2.5)
SODIUM SERPL-SCNC: 141 MMOL/L (ref 136–145)
SP GR UR STRIP: <1.005
SQUAMOUS #/AREA URNS AUTO: <1 /HPF
TARGETS BLD QL SMEAR: NORMAL
TROPONIN I SERPL HS-MCNC: <3 NG/L
UROBILINOGEN UR STRIP-ACNC: NEGATIVE EU/DL
WBC # BLD AUTO: 12.07 K/UL (ref 3.9–12.7)
WBC #/AREA URNS AUTO: 12 /HPF

## 2025-09-01 PROCEDURE — 80053 COMPREHEN METABOLIC PANEL: CPT | Performed by: STUDENT IN AN ORGANIZED HEALTH CARE EDUCATION/TRAINING PROGRAM

## 2025-09-01 PROCEDURE — 93005 ELECTROCARDIOGRAM TRACING: CPT | Performed by: GENERAL PRACTICE

## 2025-09-01 PROCEDURE — 81025 URINE PREGNANCY TEST: CPT | Performed by: STUDENT IN AN ORGANIZED HEALTH CARE EDUCATION/TRAINING PROGRAM

## 2025-09-01 PROCEDURE — 81003 URINALYSIS AUTO W/O SCOPE: CPT | Performed by: STUDENT IN AN ORGANIZED HEALTH CARE EDUCATION/TRAINING PROGRAM

## 2025-09-01 PROCEDURE — 85045 AUTOMATED RETICULOCYTE COUNT: CPT | Performed by: STUDENT IN AN ORGANIZED HEALTH CARE EDUCATION/TRAINING PROGRAM

## 2025-09-01 PROCEDURE — 93010 ELECTROCARDIOGRAM REPORT: CPT | Mod: ,,, | Performed by: GENERAL PRACTICE

## 2025-09-01 PROCEDURE — 94760 N-INVAS EAR/PLS OXIMETRY 1: CPT

## 2025-09-01 PROCEDURE — 36415 COLL VENOUS BLD VENIPUNCTURE: CPT | Performed by: STUDENT IN AN ORGANIZED HEALTH CARE EDUCATION/TRAINING PROGRAM

## 2025-09-01 PROCEDURE — 84484 ASSAY OF TROPONIN QUANT: CPT | Performed by: STUDENT IN AN ORGANIZED HEALTH CARE EDUCATION/TRAINING PROGRAM

## 2025-09-01 PROCEDURE — 85025 COMPLETE CBC W/AUTO DIFF WBC: CPT | Performed by: STUDENT IN AN ORGANIZED HEALTH CARE EDUCATION/TRAINING PROGRAM

## 2025-09-01 PROCEDURE — 87086 URINE CULTURE/COLONY COUNT: CPT | Performed by: STUDENT IN AN ORGANIZED HEALTH CARE EDUCATION/TRAINING PROGRAM

## 2025-09-01 RX ORDER — KETOROLAC TROMETHAMINE 30 MG/ML
15 INJECTION, SOLUTION INTRAMUSCULAR; INTRAVENOUS ONCE
Status: CANCELLED | OUTPATIENT
Start: 2025-09-01 | End: 2025-09-04

## 2025-09-02 LAB — BACTERIA UR CULT: NORMAL

## (undated) DEVICE — GLOVE BIOGEL PI MICRO INDIC 7

## (undated) DEVICE — LINER SUCTION 3000CC

## (undated) DEVICE — SUT 0 VICRYL / UR6 (J603)

## (undated) DEVICE — GLOVE SURG ULTRA TOUCH 7

## (undated) DEVICE — STRAP OR TABLE 5IN X 72IN

## (undated) DEVICE — CLIP HEMO-LOK MLX LARGE LF

## (undated) DEVICE — BAG TISS RETRV MONARCH 10MM

## (undated) DEVICE — GOWN POLY REINF BRTH SLV LG

## (undated) DEVICE — SLEEVE SCD EXPRESS KNEE MEDIUM

## (undated) DEVICE — GLOVE SURG ULTRA TOUCH 6

## (undated) DEVICE — GLOVE BIOGEL PIMICRO INDIC 6.5

## (undated) DEVICE — UNDERGLOVES BIOGEL PI SZ 7 LF

## (undated) DEVICE — TOWEL OR DISP STRL BLUE 4/PK

## (undated) DEVICE — DRAPE ARM DAVINCI XI

## (undated) DEVICE — PACK CUSTOM UNIV BASIN SLI

## (undated) DEVICE — PACK SIRUS BASIC V SURG STRL

## (undated) DEVICE — SOL ELECTROLUBE ANTI-STIC

## (undated) DEVICE — SEAL UNIVERSAL 5MM-8MM XI

## (undated) DEVICE — ELECTRODE REM PLYHSV RETURN 9

## (undated) DEVICE — BLADE SURG CARBON STEEL SZ11

## (undated) DEVICE — SOL CLEARIFY VISUALIZATION LAP

## (undated) DEVICE — SUT MONOCRYL 4-0 PS-2

## (undated) DEVICE — CANNULA SEAL 12MM

## (undated) DEVICE — SYR 30CC LUER LOCK

## (undated) DEVICE — ADHESIVE DERMABOND ADVANCED

## (undated) DEVICE — GLOVE SURG ULTRA TOUCH 7.5

## (undated) DEVICE — COVER TIP CURVED SCISSORS XI

## (undated) DEVICE — NDL SAFETY 21G X 1 1/2 ECLPSE

## (undated) DEVICE — TROCAR ENDO Z THREAD KII 5X100

## (undated) DEVICE — DRAPE COLUMN DAVINCI XI

## (undated) DEVICE — GOWN POLY REINF BRTH SLV XL

## (undated) DEVICE — SET TUBE PNEUMOCLEAR SE HI FLO

## (undated) DEVICE — APPLICATOR CHLORAPREP ORN 26ML

## (undated) DEVICE — SUT EASE CROSSBOW CLSR SYS

## (undated) DEVICE — DRAPE ABDOMINAL TIBURON 14X11

## (undated) DEVICE — CANNULA REDUCER 12-8MM

## (undated) DEVICE — OBTURATOR BLADELESS 8MM XI CLR